# Patient Record
Sex: MALE | Race: WHITE | Employment: UNEMPLOYED | URBAN - METROPOLITAN AREA
[De-identification: names, ages, dates, MRNs, and addresses within clinical notes are randomized per-mention and may not be internally consistent; named-entity substitution may affect disease eponyms.]

---
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
## 2021-01-04 ENCOUNTER — OFFICE VISIT (OUTPATIENT)
Dept: FAMILY MEDICINE CLINIC | Age: 21
End: 2021-01-04
Payer: COMMERCIAL

## 2021-01-04 VITALS
BODY MASS INDEX: 22.22 KG/M2 | WEIGHT: 150 LBS | HEIGHT: 69 IN | SYSTOLIC BLOOD PRESSURE: 104 MMHG | DIASTOLIC BLOOD PRESSURE: 80 MMHG | HEART RATE: 86 BPM | OXYGEN SATURATION: 98 % | TEMPERATURE: 97.2 F

## 2021-01-04 DIAGNOSIS — F41.1 GAD (GENERALIZED ANXIETY DISORDER): ICD-10-CM

## 2021-01-04 DIAGNOSIS — Z23 NEED FOR PNEUMOCOCCAL VACCINATION: ICD-10-CM

## 2021-01-04 DIAGNOSIS — F33.3 SEVERE EPISODE OF RECURRENT MAJOR DEPRESSIVE DISORDER, WITH PSYCHOTIC FEATURES (HCC): ICD-10-CM

## 2021-01-04 DIAGNOSIS — Z23 NEED FOR INFLUENZA VACCINATION: ICD-10-CM

## 2021-01-04 DIAGNOSIS — Z72.0 TOBACCO USE: ICD-10-CM

## 2021-01-04 DIAGNOSIS — Z76.89 ENCOUNTER TO ESTABLISH CARE: Primary | ICD-10-CM

## 2021-01-04 PROCEDURE — G8482 FLU IMMUNIZE ORDER/ADMIN: HCPCS | Performed by: PHYSICIAN ASSISTANT

## 2021-01-04 PROCEDURE — 99203 OFFICE O/P NEW LOW 30 MIN: CPT | Performed by: PHYSICIAN ASSISTANT

## 2021-01-04 PROCEDURE — 90471 IMMUNIZATION ADMIN: CPT | Performed by: PHYSICIAN ASSISTANT

## 2021-01-04 PROCEDURE — 90472 IMMUNIZATION ADMIN EACH ADD: CPT | Performed by: PHYSICIAN ASSISTANT

## 2021-01-04 PROCEDURE — 90732 PPSV23 VACC 2 YRS+ SUBQ/IM: CPT | Performed by: PHYSICIAN ASSISTANT

## 2021-01-04 PROCEDURE — 90686 IIV4 VACC NO PRSV 0.5 ML IM: CPT | Performed by: PHYSICIAN ASSISTANT

## 2021-01-04 PROCEDURE — G8420 CALC BMI NORM PARAMETERS: HCPCS | Performed by: PHYSICIAN ASSISTANT

## 2021-01-04 PROCEDURE — 4004F PT TOBACCO SCREEN RCVD TLK: CPT | Performed by: PHYSICIAN ASSISTANT

## 2021-01-04 PROCEDURE — G8427 DOCREV CUR MEDS BY ELIG CLIN: HCPCS | Performed by: PHYSICIAN ASSISTANT

## 2021-01-04 RX ORDER — FLUOXETINE HYDROCHLORIDE 40 MG/1
40 CAPSULE ORAL DAILY
Qty: 30 CAPSULE | Refills: 5 | Status: SHIPPED | OUTPATIENT
Start: 2021-01-04 | End: 2021-07-22

## 2021-01-04 RX ORDER — BUPROPION HYDROCHLORIDE 150 MG/1
150 TABLET ORAL EVERY MORNING
Qty: 15 TABLET | Refills: 0 | Status: SHIPPED | OUTPATIENT
Start: 2021-01-04 | End: 2021-01-04

## 2021-01-04 RX ORDER — BUPROPION HYDROCHLORIDE 150 MG/1
150 TABLET ORAL EVERY MORNING
Qty: 30 TABLET | Refills: 3 | Status: SHIPPED | OUTPATIENT
Start: 2021-01-04 | End: 2021-05-24

## 2021-01-04 ASSESSMENT — ENCOUNTER SYMPTOMS
VOMITING: 0
ABDOMINAL PAIN: 0
CONSTIPATION: 0
SHORTNESS OF BREATH: 0
COUGH: 0
DIARRHEA: 0
NAUSEA: 0
CHEST TIGHTNESS: 0

## 2021-01-04 NOTE — PROGRESS NOTES
2021  Inder Cleveland (: 2000)  24 y.o.      HPI  The pt is here to establish care  [facility]. Taking linzess and prevacid. Mood:  Pt with hx of of ZAY and moderate depression most recently seen through [facility] at [facility]. Stressors: living situation is not ideal but pt denies feeling unsafe. Current symptoms include: decreased attention span, excessive worry, shaking, anxiety, anhedonia, decreased motivation. He denies having any SI/HI, impulsive behaviors. He does have a hx of bulimia which he states was a way to \"punihs\" himself. He is currently on Wellbutrin and prozac. He reports that the Wellbutrin is treatment for ADD. Per pt, previously on Adderall which caused decreased appetite and subsequent weight loss. Tobacco Use: Smokes three cigarettes per week. He reports cutting back significantly due to side effect of nausea. Per pt, hx of asthma as a child. Denies having any current shortness of breath, chest tightness, or wheezing    Review of Systems   Constitutional: Negative for activity change, appetite change and unexpected weight change. Respiratory: Negative for cough, chest tightness and shortness of breath. Cardiovascular: Negative for chest pain, palpitations and leg swelling. Gastrointestinal: Negative for abdominal pain, constipation, diarrhea, nausea and vomiting. Heart burn   Neurological: Negative for dizziness, light-headedness and headaches. Psychiatric/Behavioral: Positive for dysphoric mood and sleep disturbance. Negative for agitation, behavioral problems, confusion, decreased concentration, hallucinations, self-injury and suicidal ideas. The patient is nervous/anxious. The patient is not hyperactive. Allergies, past medical history, family history, and social history reviewed and unchanged from previous encounter.      Current Outpatient Medications   Medication Sig Dispense Refill    FLUoxetine (PROZAC) 40 MG capsule Take 1 capsule by mouth daily 30 capsule 5    buPROPion (WELLBUTRIN XL) 150 MG extended release tablet Take 1 tablet by mouth every morning for 15 days 15 tablet 0    cetirizine (ZYRTEC) 10 MG tablet Take 10 mg by mouth daily      lansoprazole (PREVACID) 30 MG delayed release capsule Take 30 mg by mouth daily      LINZESS 145 MCG capsule Take 30 mg by mouth daily       No current facility-administered medications for this visit. Vitals:    01/04/21 1046   BP: 104/80   Pulse: 86   Temp: 97.2 °F (36.2 °C)   TempSrc: Temporal   SpO2: 98%   Weight: 150 lb (68 kg)   Height: 5' 9\" (1.753 m)     Estimated body mass index is 22.15 kg/m² as calculated from the following:    Height as of this encounter: 5' 9\" (1.753 m). Weight as of this encounter: 150 lb (68 kg). Physical Exam  Vitals signs reviewed. Constitutional:       Appearance: Normal appearance. Cardiovascular:      Rate and Rhythm: Normal rate and regular rhythm. Heart sounds: Normal heart sounds. Pulmonary:      Effort: Pulmonary effort is normal.      Breath sounds: Normal breath sounds. Abdominal:      General: Bowel sounds are normal.      Palpations: Abdomen is soft. Neurological:      Mental Status: He is alert and oriented to person, place, and time. Cranial Nerves: No cranial nerve deficit. Psychiatric:         Attention and Perception: He is inattentive. Mood and Affect: Mood is anxious. Affect is blunt. Speech: Speech normal.         Behavior: Behavior normal. Behavior is cooperative. Thought Content: Thought content normal.         Cognition and Memory: Cognition and memory normal.         Judgment: Judgment normal.      Comments: Poor eye contact and poor hygiene         ASSESSMENT and PLAN:  Vinay Figueroa was seen today for new patient. Diagnoses and all orders for this visit:    Encounter to establish care    Tobacco use       -    Discussed importance of total cessation.  Pneumovax given today    Severe episode of recurrent major depressive disorder, with psychotic features ([facility] [address]  -     FLUoxetine (PROZAC) 40 MG capsule; Take 1 capsule by mouth daily  -     buPROPion (WELLBUTRIN XL) 150 MG extended release tablet; Take 1 tablet by mouth every morning for 15 days  -   Prozac increased to 40 mg daily. Pt to follow up with [facility] for more intensive therapy and medication management. If unable to get into [facility] please see me in three months. ZAY (generalized anxiety disorder)  -     FLUoxetine (PROZAC) 40 MG capsule; Take 1 capsule by mouth daily    Need for influenza vaccination  -     INFLUENZA, QUADV, 3 YRS AND OLDER, IM PF, PREFILL SYR OR SDV, 0.5ML (AFLURIA QUADV, PF)    Need for pneumococcal vaccination  -     PNEUMOVAX 23 subcutaneous/IM (Pneumococcal polysaccharide vaccine 23-valent >= 3yo)      Return in about 3 months (around 4/4/2021) for Mood.

## 2021-01-22 ENCOUNTER — CLINICAL DOCUMENTATION (OUTPATIENT)
Dept: SPIRITUAL SERVICES | Age: 21
End: 2021-01-22

## 2021-01-22 NOTE — FLOWSHEET NOTE
Final attempt to reach Pt for Outpatient Spiritual Care support. No answer on phone. Left voicemail and will mail letter to Pt, informing him of our availability for support.     [address] [facility]       01/22/21 8352   Encounter Summary   Services provided to: Patient not available   Referral/Consult From: [facility]   (1/22 attempted call OP, left VM, mailing letter) Your Covid-10 teste resulted not detected/negative. What happens if I have a negative test?    Remember to wash your hands often, avoid touching your face, stay 6 feet from people you do not live with, and wear a cloth facemask when you go out in public. A negative COVID-19 test at one point in time does not mean you will stay negative. You could become ill with COVID-19 and/or test positive at any time. If you are a close contact of a confirmed or suspected case, continue to stay home and away from others until 14 days after your last exposure. If you do not have symptoms, and were not in close contact with a confirmed or suspected case, you can stop isolating. If you currently have symptoms of COVID-19, and were not in close contact with a confirmed or suspected case, you should keep monitoring symptoms and talk to your doctor or other healthcare provider about staying home and if you need to get tested again. If you develop symptoms of COVID-19, stay at home and away from others and talk to your doctor or other healthcare provider about getting tested again. For additional information, visit coronavirus. [place].gov. For answers to your COVID-19 questions, call 4-864-6-ASK-[facility] ([phone]).

## 2021-05-24 RX ORDER — BUPROPION HYDROCHLORIDE 150 MG/1
TABLET ORAL
Qty: 90 TABLET | Refills: 1 | Status: SHIPPED | OUTPATIENT
Start: 2021-05-24 | End: 2021-12-01

## 2021-05-24 NOTE — TELEPHONE ENCOUNTER
.  Last office visit 1/4/2021     Last written 1-4-21 30 with 3      Next office visit scheduled Visit date not found    Requested Prescriptions     Pending Prescriptions Disp Refills    buPROPion (WELLBUTRIN XL) 150 MG extended release tablet [Pharmacy Med Name: BUPROPION HCL  MG TABLET] 90 tablet 1     Sig: TAKE 1 TABLET BY MOUTH EVERY DAY IN THE MORNING

## 2021-07-22 DIAGNOSIS — F33.3 SEVERE EPISODE OF RECURRENT MAJOR DEPRESSIVE DISORDER, WITH PSYCHOTIC FEATURES (HCC): ICD-10-CM

## 2021-07-22 DIAGNOSIS — F41.1 GAD (GENERALIZED ANXIETY DISORDER): ICD-10-CM

## 2021-07-22 RX ORDER — FLUOXETINE HYDROCHLORIDE 40 MG/1
CAPSULE ORAL
Qty: 30 CAPSULE | Refills: 5 | Status: SHIPPED | OUTPATIENT
Start: 2021-07-22 | End: 2021-12-01

## 2021-07-22 NOTE — TELEPHONE ENCOUNTER
Refill Request     Last Seen: Last Seen Department: 1/4/2021  Last Seen by PCP: 1/4/2021    Last Written: 1/4/21    Next Appointment:   No future appointments.         Requested Prescriptions     Pending Prescriptions Disp Refills    FLUoxetine (PROZAC) 40 MG capsule [Pharmacy Med Name: FLUOXETINE HCL 40 MG CAPSULE] 30 capsule 5     Sig: TAKE 1 CAPSULE BY MOUTH EVERY DAY

## 2021-12-01 ENCOUNTER — OFFICE VISIT (OUTPATIENT)
Dept: FAMILY MEDICINE CLINIC | Age: 21
End: 2021-12-01
Payer: COMMERCIAL

## 2021-12-01 VITALS
HEART RATE: 62 BPM | WEIGHT: 144 LBS | SYSTOLIC BLOOD PRESSURE: 110 MMHG | BODY MASS INDEX: 21.27 KG/M2 | OXYGEN SATURATION: 98 % | DIASTOLIC BLOOD PRESSURE: 68 MMHG

## 2021-12-01 DIAGNOSIS — Z23 NEED FOR INFLUENZA VACCINATION: ICD-10-CM

## 2021-12-01 DIAGNOSIS — F41.1 GAD (GENERALIZED ANXIETY DISORDER): ICD-10-CM

## 2021-12-01 DIAGNOSIS — F33.3 SEVERE EPISODE OF RECURRENT MAJOR DEPRESSIVE DISORDER, WITH PSYCHOTIC FEATURES (HCC): Primary | ICD-10-CM

## 2021-12-01 PROCEDURE — 90471 IMMUNIZATION ADMIN: CPT | Performed by: PHYSICIAN ASSISTANT

## 2021-12-01 PROCEDURE — G8427 DOCREV CUR MEDS BY ELIG CLIN: HCPCS | Performed by: PHYSICIAN ASSISTANT

## 2021-12-01 PROCEDURE — G8420 CALC BMI NORM PARAMETERS: HCPCS | Performed by: PHYSICIAN ASSISTANT

## 2021-12-01 PROCEDURE — 4004F PT TOBACCO SCREEN RCVD TLK: CPT | Performed by: PHYSICIAN ASSISTANT

## 2021-12-01 PROCEDURE — 99213 OFFICE O/P EST LOW 20 MIN: CPT | Performed by: PHYSICIAN ASSISTANT

## 2021-12-01 PROCEDURE — 90674 CCIIV4 VAC NO PRSV 0.5 ML IM: CPT | Performed by: PHYSICIAN ASSISTANT

## 2021-12-01 PROCEDURE — G8482 FLU IMMUNIZE ORDER/ADMIN: HCPCS | Performed by: PHYSICIAN ASSISTANT

## 2021-12-01 RX ORDER — VENLAFAXINE HYDROCHLORIDE 37.5 MG/1
CAPSULE, EXTENDED RELEASE ORAL
Qty: 56 CAPSULE | Refills: 0 | Status: SHIPPED | OUTPATIENT
Start: 2021-12-01 | End: 2022-01-24

## 2021-12-01 SDOH — ECONOMIC STABILITY: HOUSING INSECURITY: IN THE LAST 12 MONTHS, HOW MANY PLACES HAVE YOU LIVED?: 1

## 2021-12-01 SDOH — ECONOMIC STABILITY: TRANSPORTATION INSECURITY
IN THE PAST 12 MONTHS, HAS LACK OF TRANSPORTATION KEPT YOU FROM MEETINGS, WORK, OR FROM GETTING THINGS NEEDED FOR DAILY LIVING?: NO

## 2021-12-01 SDOH — ECONOMIC STABILITY: FOOD INSECURITY: WITHIN THE PAST 12 MONTHS, YOU WORRIED THAT YOUR FOOD WOULD RUN OUT BEFORE YOU GOT MONEY TO BUY MORE.: NEVER TRUE

## 2021-12-01 SDOH — ECONOMIC STABILITY: FOOD INSECURITY: WITHIN THE PAST 12 MONTHS, THE FOOD YOU BOUGHT JUST DIDN'T LAST AND YOU DIDN'T HAVE MONEY TO GET MORE.: NEVER TRUE

## 2021-12-01 SDOH — ECONOMIC STABILITY: TRANSPORTATION INSECURITY
IN THE PAST 12 MONTHS, HAS THE LACK OF TRANSPORTATION KEPT YOU FROM MEDICAL APPOINTMENTS OR FROM GETTING MEDICATIONS?: NO

## 2021-12-01 SDOH — ECONOMIC STABILITY: INCOME INSECURITY: IN THE LAST 12 MONTHS, WAS THERE A TIME WHEN YOU WERE NOT ABLE TO PAY THE MORTGAGE OR RENT ON TIME?: NO

## 2021-12-01 SDOH — ECONOMIC STABILITY: HOUSING INSECURITY
IN THE LAST 12 MONTHS, WAS THERE A TIME WHEN YOU DID NOT HAVE A STEADY PLACE TO SLEEP OR SLEPT IN A SHELTER (INCLUDING NOW)?: NO

## 2021-12-01 ASSESSMENT — SOCIAL DETERMINANTS OF HEALTH (SDOH): HOW HARD IS IT FOR YOU TO PAY FOR THE VERY BASICS LIKE FOOD, HOUSING, MEDICAL CARE, AND HEATING?: NOT HARD AT ALL

## 2021-12-01 NOTE — PROGRESS NOTES
Med Scott (:  2000) is a 22 y.o. male,Established patient, here for evaluation of the following chief complaint(s):  Depression (follow up, discuss meds )         ASSESSMENT/PLAN:  1. Severe episode of recurrent major depressive disorder, with psychotic features (HCC)  -     venlafaxine (EFFEXOR XR) 37.5 MG extended release capsule; Take 1 capsule by mouth daily for 14 days, THEN 2 capsules daily for 21 days. , Disp-56 capsule, R-0Normal  -     Medication risks, benefits and side effects discussed. When anxiety and depression are under better control we could consider trying a different stimulant medication than Adderall. Mom agrees with this plan  2. ZAY (generalized anxiety disorder)  -     venlafaxine (EFFEXOR XR) 37.5 MG extended release capsule; Take 1 capsule by mouth daily for 14 days, THEN 2 capsules daily for 21 days. , Disp-56 capsule, R-0Normal  3. Need for influenza vaccination  -     INFLUENZA, MDCK QUADV, 2 YRS AND OLDER, IM, PF, PREFILL SYR OR SDV, 0.5ML (FLUCELVAX QUADV, PF)      Return in about 5 weeks (around 2022) for Mood. Subjective   SUBJECTIVE/OBJECTIVE:  HPI  Depression:  Current medication: wellbutrin and prozac, pt stopped this medication after our last visit due to side effects  Side effects: none  Residual symptoms: anxiety, depression, increased stress  Denies: suicidal ideation, difficulty sleeping, paranoia or manic type symptoms  Past medications:Lexapro, wellbutrin, prozac    There is a hx of ADD that they would like to have addressed however the pt is reluctant to start a stimulant due to side effects from Adderall that he was on as a child  Mood is currently uncontrolled      Review of Systems   Constitutional: Negative for appetite change and unexpected weight change. Neurological: Negative for dizziness, light-headedness and headaches. Psychiatric/Behavioral: Positive for decreased concentration and dysphoric mood.  Negative for agitation, behavioral problems, confusion, hallucinations, self-injury, sleep disturbance and suicidal ideas. The patient is nervous/anxious. The patient is not hyperactive. Objective   Physical Exam  Vitals reviewed. Constitutional:       Appearance: Normal appearance. Comments: Poor eye contact   Neurological:      Mental Status: He is alert. Psychiatric:         Attention and Perception: Perception normal. He is inattentive. Mood and Affect: Affect normal. Mood is anxious. Speech: Speech is delayed. Behavior: Behavior normal. Behavior is cooperative. Thought Content: Thought content normal.         Cognition and Memory: Cognition and memory normal.         Judgment: Judgment normal.                  An electronic signature was used to authenticate this note.     --SIMRAN Chaves

## 2021-12-06 ENCOUNTER — TELEPHONE (OUTPATIENT)
Dept: FAMILY MEDICINE CLINIC | Age: 21
End: 2021-12-06

## 2021-12-06 NOTE — TELEPHONE ENCOUNTER
Received [facility] results back. Effexor is on the approved list of medications, please see if they would like a copy of his results.  Thank you

## 2022-01-10 ENCOUNTER — TELEPHONE (OUTPATIENT)
Dept: FAMILY MEDICINE CLINIC | Age: 22
End: 2022-01-10

## 2022-01-24 DIAGNOSIS — F33.3 SEVERE EPISODE OF RECURRENT MAJOR DEPRESSIVE DISORDER, WITH PSYCHOTIC FEATURES (HCC): ICD-10-CM

## 2022-01-24 DIAGNOSIS — F41.1 GAD (GENERALIZED ANXIETY DISORDER): ICD-10-CM

## 2022-01-24 RX ORDER — VENLAFAXINE HYDROCHLORIDE 75 MG/1
75 CAPSULE, EXTENDED RELEASE ORAL DAILY
Qty: 90 CAPSULE | Refills: 1 | Status: SHIPPED | OUTPATIENT
Start: 2022-01-24 | End: 2022-07-08

## 2022-01-24 NOTE — TELEPHONE ENCOUNTER
Refill Request     Last Seen: Last Seen Department: 12/1/2021  Last Seen by PCP: 12/1/2021    Last Written: 12/1/2021    Next Appointment:   Future Appointments   Date Time Provider Peter Pyle   2/22/2022  9:00 AM SIMRAN Chang             Requested Prescriptions     Pending Prescriptions Disp Refills    venlafaxine (EFFEXOR XR) 37.5 MG extended release capsule [Pharmacy Med Name: VENLAFAXINE HCL ER 37.5 MG CAP] 56 capsule 0     Sig: TAKE 1 CAPSULE BY MOUTH DAILY FOR 14 DAYS, THEN 2 CAPSULES DAILY FOR 21 DAYS.

## 2022-02-22 ENCOUNTER — OFFICE VISIT (OUTPATIENT)
Dept: FAMILY MEDICINE CLINIC | Age: 22
End: 2022-02-22
Payer: COMMERCIAL

## 2022-02-22 VITALS
SYSTOLIC BLOOD PRESSURE: 116 MMHG | BODY MASS INDEX: 20 KG/M2 | TEMPERATURE: 98.1 F | HEIGHT: 68 IN | OXYGEN SATURATION: 98 % | HEART RATE: 65 BPM | DIASTOLIC BLOOD PRESSURE: 72 MMHG | WEIGHT: 132 LBS

## 2022-02-22 DIAGNOSIS — K21.9 GASTROESOPHAGEAL REFLUX DISEASE, UNSPECIFIED WHETHER ESOPHAGITIS PRESENT: ICD-10-CM

## 2022-02-22 DIAGNOSIS — F33.3 SEVERE EPISODE OF RECURRENT MAJOR DEPRESSIVE DISORDER, WITH PSYCHOTIC FEATURES (HCC): Primary | ICD-10-CM

## 2022-02-22 DIAGNOSIS — F50.2 BULIMIA NERVOSA: ICD-10-CM

## 2022-02-22 DIAGNOSIS — F41.1 GAD (GENERALIZED ANXIETY DISORDER): ICD-10-CM

## 2022-02-22 PROBLEM — F50.20 BULIMIA NERVOSA: Status: ACTIVE | Noted: 2022-02-22

## 2022-02-22 PROCEDURE — G8427 DOCREV CUR MEDS BY ELIG CLIN: HCPCS | Performed by: PHYSICIAN ASSISTANT

## 2022-02-22 PROCEDURE — 99214 OFFICE O/P EST MOD 30 MIN: CPT | Performed by: PHYSICIAN ASSISTANT

## 2022-02-22 PROCEDURE — 4004F PT TOBACCO SCREEN RCVD TLK: CPT | Performed by: PHYSICIAN ASSISTANT

## 2022-02-22 PROCEDURE — G8420 CALC BMI NORM PARAMETERS: HCPCS | Performed by: PHYSICIAN ASSISTANT

## 2022-02-22 PROCEDURE — G8482 FLU IMMUNIZE ORDER/ADMIN: HCPCS | Performed by: PHYSICIAN ASSISTANT

## 2022-02-22 RX ORDER — LANSOPRAZOLE 30 MG/1
30 CAPSULE, DELAYED RELEASE ORAL DAILY
Qty: 90 CAPSULE | Refills: 1 | Status: SHIPPED | OUTPATIENT
Start: 2022-02-22 | End: 2022-09-19

## 2022-02-22 RX ORDER — DESVENLAFAXINE 50 MG/1
50 TABLET, EXTENDED RELEASE ORAL DAILY
Qty: 30 TABLET | Refills: 3 | Status: SHIPPED | OUTPATIENT
Start: 2022-02-22 | End: 2022-06-20

## 2022-02-22 ASSESSMENT — ENCOUNTER SYMPTOMS
BLOOD IN STOOL: 0
ABDOMINAL DISTENTION: 0
VOMITING: 1
ABDOMINAL PAIN: 0

## 2022-02-22 ASSESSMENT — PATIENT HEALTH QUESTIONNAIRE - PHQ9
9. THOUGHTS THAT YOU WOULD BE BETTER OFF DEAD, OR OF HURTING YOURSELF: 0
3. TROUBLE FALLING OR STAYING ASLEEP: 0
5. POOR APPETITE OR OVEREATING: 3
4. FEELING TIRED OR HAVING LITTLE ENERGY: 0
SUM OF ALL RESPONSES TO PHQ QUESTIONS 1-9: 15
10. IF YOU CHECKED OFF ANY PROBLEMS, HOW DIFFICULT HAVE THESE PROBLEMS MADE IT FOR YOU TO DO YOUR WORK, TAKE CARE OF THINGS AT HOME, OR GET ALONG WITH OTHER PEOPLE: 1
SUM OF ALL RESPONSES TO PHQ9 QUESTIONS 1 & 2: 4
SUM OF ALL RESPONSES TO PHQ QUESTIONS 1-9: 15
7. TROUBLE CONCENTRATING ON THINGS, SUCH AS READING THE NEWSPAPER OR WATCHING TELEVISION: 3
6. FEELING BAD ABOUT YOURSELF - OR THAT YOU ARE A FAILURE OR HAVE LET YOURSELF OR YOUR FAMILY DOWN: 3
2. FEELING DOWN, DEPRESSED OR HOPELESS: 2
8. MOVING OR SPEAKING SO SLOWLY THAT OTHER PEOPLE COULD HAVE NOTICED. OR THE OPPOSITE, BEING SO FIGETY OR RESTLESS THAT YOU HAVE BEEN MOVING AROUND A LOT MORE THAN USUAL: 2
SUM OF ALL RESPONSES TO PHQ QUESTIONS 1-9: 15
1. LITTLE INTEREST OR PLEASURE IN DOING THINGS: 2
SUM OF ALL RESPONSES TO PHQ QUESTIONS 1-9: 15

## 2022-02-22 NOTE — PROGRESS NOTES
Med Scott (:  2000) is a 22 y.o. male,Established patient, here for evaluation of the following chief complaint(s): Anxiety and Depression         ASSESSMENT/PLAN:  1. Severe episode of recurrent major depressive disorder, with psychotic features ([address]  -     desvenlafaxine succinate (PRISTIQ) 50 MG TB24 extended release tablet; Take 1 tablet by mouth daily, Disp-30 tablet, R-3Normal  -     Uncontrolled. Medication risks, benefits and side effects discussed. Pt has stopped effexor so no need to wean off of medication  2. ZAY (generalized anxiety disorder)  -     desvenlafaxine succinate (PRISTIQ) 50 MG TB24 extended release tablet; Take 1 tablet by mouth daily, Disp-30 tablet, R-3Normal  3. Bulimia nervosa       -   Follow up with [facility]. Gene sight results reviewed with the patient and given to the patient today to take to [facility]. Encouraged them to follow up with psychiatry there for eating disorder and mood  4. Gastroesophageal reflux disease, unspecified whether esophagitis present  -     lansoprazole (PREVACID) 30 MG delayed release capsule; Take 1 capsule by mouth daily, Disp-90 capsule, R-1Normal        -     Uncontrolled, restart medication, follow up with GI in April    No follow-ups on file. Subjective   SUBJECTIVE/OBJECTIVE:  HPI  Depression:  Current medication: effexor 75 mg, stopped taking this medication one week ago due to irritability, has noticed an improvement without this medication  Side effects: irritability  Residual symptoms: depression, anxiety, intentional vomiting and weight loss  Denies: SI/HI, impulsive behaviors  Past medications: Lexapro, wellubtrin, prozac and effexor  Pt has been referred to the [facility] for help with eating disorders    Will be seeing GI in April for vomiting as there is concern that vomiting may also be unintentional. Mom has a personal history of gastroparesis.  [facility]  Needs a refill on his prevacid    Review of Systems   Constitutional: Positive for unexpected weight change. Negative for activity change and appetite change. Eyes: Negative for visual disturbance. Gastrointestinal: Positive for vomiting. Negative for abdominal distention, abdominal pain and blood in stool. Neurological: Negative for light-headedness. Psychiatric/Behavioral: Positive for agitation, dysphoric mood and sleep disturbance. Negative for behavioral problems, confusion, decreased concentration, hallucinations, self-injury and suicidal ideas. The patient is nervous/anxious. The patient is not hyperactive. Objective   Physical Exam  Vitals reviewed. Neurological:      Mental Status: He is alert and oriented to person, place, and time. Cranial Nerves: No cranial nerve deficit. Psychiatric:         Attention and Perception: Perception normal. He is inattentive. Mood and Affect: Mood is depressed. Affect is flat. Speech: Speech normal.         Behavior: Behavior is withdrawn. Behavior is cooperative. Thought Content: Thought content normal.         Cognition and Memory: Cognition and memory normal.         Judgment: Judgment normal.      Comments: Poor eye contact                  An electronic signature was used to authenticate this note.     --SIMRAN Granados

## 2022-02-23 ENCOUNTER — TELEPHONE (OUTPATIENT)
Dept: FAMILY MEDICINE CLINIC | Age: 22
End: 2022-02-23

## 2022-02-24 NOTE — TELEPHONE ENCOUNTER
Submitted PA for Lansoprazole 30MG dr capsules, Key: [id number]. Medication has been APPROVED through 02/24/2023. Please notify patient. Thank you.

## 2022-06-02 ENCOUNTER — TELEPHONE (OUTPATIENT)
Dept: FAMILY MEDICINE CLINIC | Age: 22
End: 2022-06-02

## 2022-06-02 NOTE — TELEPHONE ENCOUNTER
----- Message from Maraline Coleman sent at 6/2/2022  2:09 PM EDT -----  Subject: Message to Provider    QUESTIONS  Information for Provider? Mother Del Gilbert called in and requested to get   basic overall labs done for patient. Please call and advise on scheduling.     ---------------------------------------------------------------------------  --------------  CALL BACK INFO  What is the best way for the office to contact you? OK to leave message on   voicemail  Preferred Call Back Phone Number? [phone]  ---------------------------------------------------------------------------  --------------  SCRIPT ANSWERS  Relationship to Patient? Parent  Representative Name? Geena  Patient is under 25 and the Parent has custody? No  Is the Representative on the appropriate HIPAA document in Epic?  Yes

## 2022-06-20 DIAGNOSIS — F41.1 GAD (GENERALIZED ANXIETY DISORDER): ICD-10-CM

## 2022-06-20 DIAGNOSIS — F33.3 SEVERE EPISODE OF RECURRENT MAJOR DEPRESSIVE DISORDER, WITH PSYCHOTIC FEATURES (HCC): ICD-10-CM

## 2022-06-20 RX ORDER — DESVENLAFAXINE 50 MG/1
TABLET, EXTENDED RELEASE ORAL
Qty: 90 TABLET | Refills: 1 | Status: SHIPPED | OUTPATIENT
Start: 2022-06-20

## 2022-06-20 NOTE — TELEPHONE ENCOUNTER
.  Refill Request     CONFIRM preferrred pharmacy with the patient. If Mail Order Rx - Pend for 90 day refill.       Last Seen: Last Seen Department: 2/22/2022  Last Seen by PCP: 2/22/2022    Last Written: 2-22-22 30 with 3     Next Appointment:   Future Appointments   Date Time Provider Peter Pyle   7/8/2022  2:30 PM SIMRAN Marroquin  [place] [facility]       Future appointment scheduled      Requested Prescriptions     Pending Prescriptions Disp Refills    desvenlafaxine succinate (PRISTIQ) 50 MG TB24 extended release tablet [Pharmacy Med Name: DESVENLAFAXINE SUCCNT ER 50 MG] 30 tablet 3     Sig: TAKE 1 TABLET BY MOUTH EVERY DAY

## 2022-06-28 PROBLEM — F50.82 AVOIDANT/RESTRICTIVE FOOD INTAKE DISORDER: Status: ACTIVE | Noted: 2022-06-28

## 2022-07-08 ENCOUNTER — OFFICE VISIT (OUTPATIENT)
Dept: FAMILY MEDICINE CLINIC | Age: 22
End: 2022-07-08
Payer: COMMERCIAL

## 2022-07-08 VITALS
HEIGHT: 69 IN | WEIGHT: 145.6 LBS | HEART RATE: 87 BPM | SYSTOLIC BLOOD PRESSURE: 132 MMHG | OXYGEN SATURATION: 99 % | BODY MASS INDEX: 21.56 KG/M2 | DIASTOLIC BLOOD PRESSURE: 70 MMHG

## 2022-07-08 DIAGNOSIS — F50.82 AVOIDANT/RESTRICTIVE FOOD INTAKE DISORDER: ICD-10-CM

## 2022-07-08 DIAGNOSIS — F33.3 SEVERE EPISODE OF RECURRENT MAJOR DEPRESSIVE DISORDER, WITH PSYCHOTIC FEATURES (HCC): Primary | ICD-10-CM

## 2022-07-08 DIAGNOSIS — K58.1 IRRITABLE BOWEL SYNDROME WITH CONSTIPATION: ICD-10-CM

## 2022-07-08 DIAGNOSIS — F41.1 GAD (GENERALIZED ANXIETY DISORDER): ICD-10-CM

## 2022-07-08 DIAGNOSIS — F50.2 BULIMIA NERVOSA: ICD-10-CM

## 2022-07-08 LAB
A/G RATIO: 2.8 (ref 1.1–2.2)
ALBUMIN SERPL-MCNC: 4.7 G/DL (ref 3.4–5)
ALP BLD-CCNC: 95 U/L (ref 40–129)
ALT SERPL-CCNC: 26 U/L (ref 10–40)
ANION GAP SERPL CALCULATED.3IONS-SCNC: 12 MMOL/L (ref 3–16)
AST SERPL-CCNC: 29 U/L (ref 15–37)
BASOPHILS ABSOLUTE: 0 K/UL (ref 0–0.2)
BASOPHILS RELATIVE PERCENT: 0.6 %
BILIRUB SERPL-MCNC: <0.2 MG/DL (ref 0–1)
BUN BLDV-MCNC: 13 MG/DL (ref 7–20)
CALCIUM SERPL-MCNC: 9.1 MG/DL (ref 8.3–10.6)
CHLORIDE BLD-SCNC: 105 MMOL/L (ref 99–110)
CO2: 27 MMOL/L (ref 21–32)
CREAT SERPL-MCNC: 0.7 MG/DL (ref 0.9–1.3)
EOSINOPHILS ABSOLUTE: 0.2 K/UL (ref 0–0.6)
EOSINOPHILS RELATIVE PERCENT: 2.8 %
GFR AFRICAN AMERICAN: >60
GFR NON-AFRICAN AMERICAN: >60
GLUCOSE BLD-MCNC: 84 MG/DL (ref 70–99)
HCT VFR BLD CALC: 37.9 % (ref 40.5–52.5)
HEMOGLOBIN: 13.1 G/DL (ref 13.5–17.5)
LYMPHOCYTES ABSOLUTE: 2 K/UL (ref 1–5.1)
LYMPHOCYTES RELATIVE PERCENT: 29.7 %
MCH RBC QN AUTO: 35 PG (ref 26–34)
MCHC RBC AUTO-ENTMCNC: 34.4 G/DL (ref 31–36)
MCV RBC AUTO: 101.7 FL (ref 80–100)
MONOCYTES ABSOLUTE: 0.4 K/UL (ref 0–1.3)
MONOCYTES RELATIVE PERCENT: 6.4 %
NEUTROPHILS ABSOLUTE: 4.1 K/UL (ref 1.7–7.7)
NEUTROPHILS RELATIVE PERCENT: 60.5 %
PDW BLD-RTO: 13.7 % (ref 12.4–15.4)
PLATELET # BLD: 304 K/UL (ref 135–450)
PMV BLD AUTO: 7.2 FL (ref 5–10.5)
POTASSIUM SERPL-SCNC: 4.2 MMOL/L (ref 3.5–5.1)
RBC # BLD: 3.73 M/UL (ref 4.2–5.9)
SODIUM BLD-SCNC: 144 MMOL/L (ref 136–145)
TOTAL PROTEIN: 6.4 G/DL (ref 6.4–8.2)
WBC # BLD: 6.7 K/UL (ref 4–11)

## 2022-07-08 PROCEDURE — G8427 DOCREV CUR MEDS BY ELIG CLIN: HCPCS | Performed by: PHYSICIAN ASSISTANT

## 2022-07-08 PROCEDURE — 99214 OFFICE O/P EST MOD 30 MIN: CPT | Performed by: PHYSICIAN ASSISTANT

## 2022-07-08 PROCEDURE — G8420 CALC BMI NORM PARAMETERS: HCPCS | Performed by: PHYSICIAN ASSISTANT

## 2022-07-08 PROCEDURE — 1036F TOBACCO NON-USER: CPT | Performed by: PHYSICIAN ASSISTANT

## 2022-07-08 RX ORDER — BUSPIRONE HYDROCHLORIDE 10 MG/1
10 TABLET ORAL 2 TIMES DAILY
Qty: 60 TABLET | Refills: 0 | Status: SHIPPED | OUTPATIENT
Start: 2022-07-08 | End: 2022-08-04

## 2022-07-08 ASSESSMENT — ENCOUNTER SYMPTOMS
CONSTIPATION: 0
BLOOD IN STOOL: 0
ABDOMINAL PAIN: 0

## 2022-07-08 ASSESSMENT — ANXIETY QUESTIONNAIRES
5. BEING SO RESTLESS THAT IT IS HARD TO SIT STILL: 2
4. TROUBLE RELAXING: 3
2. NOT BEING ABLE TO STOP OR CONTROL WORRYING: 3
3. WORRYING TOO MUCH ABOUT DIFFERENT THINGS: 2
GAD7 TOTAL SCORE: 13
1. FEELING NERVOUS, ANXIOUS, OR ON EDGE: 2
6. BECOMING EASILY ANNOYED OR IRRITABLE: 1
7. FEELING AFRAID AS IF SOMETHING AWFUL MIGHT HAPPEN: 0
IF YOU CHECKED OFF ANY PROBLEMS ON THIS QUESTIONNAIRE, HOW DIFFICULT HAVE THESE PROBLEMS MADE IT FOR YOU TO DO YOUR WORK, TAKE CARE OF THINGS AT HOME, OR GET ALONG WITH OTHER PEOPLE: SOMEWHAT DIFFICULT

## 2022-07-08 ASSESSMENT — PATIENT HEALTH QUESTIONNAIRE - PHQ9
6. FEELING BAD ABOUT YOURSELF - OR THAT YOU ARE A FAILURE OR HAVE LET YOURSELF OR YOUR FAMILY DOWN: 2
10. IF YOU CHECKED OFF ANY PROBLEMS, HOW DIFFICULT HAVE THESE PROBLEMS MADE IT FOR YOU TO DO YOUR WORK, TAKE CARE OF THINGS AT HOME, OR GET ALONG WITH OTHER PEOPLE: 1
SUM OF ALL RESPONSES TO PHQ QUESTIONS 1-9: 15
9. THOUGHTS THAT YOU WOULD BE BETTER OFF DEAD, OR OF HURTING YOURSELF: 0
3. TROUBLE FALLING OR STAYING ASLEEP: 3
7. TROUBLE CONCENTRATING ON THINGS, SUCH AS READING THE NEWSPAPER OR WATCHING TELEVISION: 1
8. MOVING OR SPEAKING SO SLOWLY THAT OTHER PEOPLE COULD HAVE NOTICED. OR THE OPPOSITE, BEING SO FIGETY OR RESTLESS THAT YOU HAVE BEEN MOVING AROUND A LOT MORE THAN USUAL: 2
2. FEELING DOWN, DEPRESSED OR HOPELESS: 2
1. LITTLE INTEREST OR PLEASURE IN DOING THINGS: 2
5. POOR APPETITE OR OVEREATING: 1
4. FEELING TIRED OR HAVING LITTLE ENERGY: 2
SUM OF ALL RESPONSES TO PHQ9 QUESTIONS 1 & 2: 4

## 2022-07-08 NOTE — PROGRESS NOTES
Med Scott (:  2000) is a 25 y.o. male,Established patient, here for evaluation of the following chief complaint(s):  Blood Work         ASSESSMENT/PLAN:  1. Severe episode of recurrent major depressive disorder, with psychotic features ([address]       -    Improved, not at goal. Continue with pristiq. Resources provided for therapy. 2. ZAY (generalized anxiety disorder)  -     busPIRone (BUSPAR) 10 MG tablet; Take 1 tablet by mouth 2 times daily, Disp-60 tablet, R-0Normal  -    Uncontrolled, majority of symptoms. Start buspar. Medication risks, benefits and side effects discussed with the patient. Follow up in 3 months  3. Avoidant/restrictive food intake disorder  -     CBC with Auto Differential; Future  -     Comprehensive Metabolic Panel; Future  -     Vitamin D 25 Hydroxy; Future  -     Vitamin B12; Future  -     Folate; Future  -     Prealbumin; Future  -     Has been increasing protein in diet and per pt, had a total 20 lb weight gain  4. Bulimia nervosa  -     CBC with Auto Differential; Future  -     Comprehensive Metabolic Panel; Future  -     Vitamin D 25 Hydroxy; Future  -     Vitamin B12; Future  -     Folate; Future  -     Prealbumin; Future  -     Stable, has been 2 months since purging      Return in about 3 months (around 10/8/2022) for ZAY and MDD. Subjective   SUBJECTIVE/OBJECTIVE:  HPI  MDD:  Improving per pt  Current medication: pristiq  Current symptoms: excessive worry, anxiety, sadness, waking at night with panic  Denies: bulimia, dysphoric mood, paranoia, hallucinations, food restriction, SI/HI  Has gained 20 lbs by adding in meals and protein  Has appointment in one month to see [facility] and potentially get connected with a therapist that specializes in eating disorders  Just started a new job  Stopped smoking tobacco but still using marijuana    Review of Systems   Constitutional: Positive for appetite change. Negative for activity change and unexpected weight change. Gastrointestinal: Negative for abdominal pain, blood in stool and constipation. Neurological: Negative for dizziness and headaches. Psychiatric/Behavioral: Positive for behavioral problems, dysphoric mood and sleep disturbance. Negative for agitation, confusion, decreased concentration, hallucinations, self-injury and suicidal ideas. The patient is nervous/anxious. The patient is not hyperactive. Objective   Physical Exam  Vitals reviewed. Constitutional:       Appearance: Normal appearance. Neck:      Thyroid: No thyromegaly. Cardiovascular:      Rate and Rhythm: Normal rate and regular rhythm. Heart sounds: Normal heart sounds. Pulmonary:      Effort: Pulmonary effort is normal.      Breath sounds: Normal breath sounds. Abdominal:      General: Bowel sounds are normal.      Palpations: Abdomen is soft. Neurological:      Mental Status: He is alert and oriented to person, place, and time. Cranial Nerves: No cranial nerve deficit. Psychiatric:         Attention and Perception: Attention and perception normal.         Mood and Affect: Mood is anxious and depressed. Speech: Speech normal.         Behavior: Behavior normal. Behavior is cooperative. Thought Content: Thought content normal.         Cognition and Memory: Cognition and memory normal.         Judgment: Judgment normal.                  An electronic signature was used to authenticate this note.     --SIMRAN Yepez

## 2022-07-09 DIAGNOSIS — E53.8 VITAMIN B 12 DEFICIENCY: ICD-10-CM

## 2022-07-09 DIAGNOSIS — E55.9 VITAMIN D INSUFFICIENCY: Primary | ICD-10-CM

## 2022-07-09 LAB
FOLATE: <2 NG/ML (ref 4.78–24.2)
PREALBUMIN: 36.3 MG/DL (ref 20–40)
VITAMIN B-12: 174 PG/ML (ref 211–911)
VITAMIN D 25-HYDROXY: 23.7 NG/ML

## 2022-07-09 RX ORDER — FOLIC ACID 1 MG/1
1 TABLET ORAL DAILY
Qty: 90 TABLET | Refills: 3 | Status: SHIPPED | OUTPATIENT
Start: 2022-07-09

## 2022-07-09 RX ORDER — LANOLIN ALCOHOL/MO/W.PET/CERES
1000 CREAM (GRAM) TOPICAL DAILY
Qty: 90 TABLET | Refills: 3 | Status: SHIPPED | OUTPATIENT
Start: 2022-07-09

## 2022-07-09 RX ORDER — MELATONIN
1000 DAILY
Qty: 90 TABLET | Refills: 1 | Status: SHIPPED | OUTPATIENT
Start: 2022-07-09

## 2022-07-20 DIAGNOSIS — F33.3 SEVERE EPISODE OF RECURRENT MAJOR DEPRESSIVE DISORDER, WITH PSYCHOTIC FEATURES (HCC): ICD-10-CM

## 2022-07-20 DIAGNOSIS — F41.1 GAD (GENERALIZED ANXIETY DISORDER): ICD-10-CM

## 2022-07-20 RX ORDER — VENLAFAXINE HYDROCHLORIDE 75 MG/1
CAPSULE, EXTENDED RELEASE ORAL
Qty: 30 CAPSULE | Refills: 5 | OUTPATIENT
Start: 2022-07-20

## 2022-07-20 NOTE — TELEPHONE ENCOUNTER
Refill Request     CONFIRM preferrred pharmacy with the patient. If Mail Order Rx - Pend for 90 day refill.       Last Seen: Last Seen Department: 7/8/2022  Last Seen by PCP: 7/8/2022    Last Written: 01/24/2022 90 Capsule 1 Refill    Next Appointment:   Future Appointments   Date Time Provider Peter Pyle   10/7/2022  3:15 PM SIMRAN Lincoln  [place] [facility]       Future appointment scheduled      Requested Prescriptions     Pending Prescriptions Disp Refills    venlafaxine (EFFEXOR XR) 75 MG extended release capsule [Pharmacy Med Name: VENLAFAXINE HCL ER 75 MG CAP] 30 capsule 5     Sig: TAKE 1 CAPSULE BY MOUTH EVERY DAY

## 2022-08-04 DIAGNOSIS — F41.1 GAD (GENERALIZED ANXIETY DISORDER): ICD-10-CM

## 2022-08-04 RX ORDER — BUSPIRONE HYDROCHLORIDE 10 MG/1
TABLET ORAL
Qty: 60 TABLET | Refills: 0 | Status: SHIPPED | OUTPATIENT
Start: 2022-08-04 | End: 2022-09-09 | Stop reason: SDUPTHER

## 2022-08-04 NOTE — TELEPHONE ENCOUNTER
Refill Request     CONFIRM preferrred pharmacy with the patient. If Mail Order Rx - Pend for 90 day refill.       Last Seen: Last Seen Department: 7/8/2022  Last Seen by PCP: 7/8/2022    Last Written: 07/08/2022 60 tablet 0 refills    Next Appointment:   Future Appointments   Date Time Provider Peter Pyle   10/7/2022  3:15 PM SIMRAN Dow  [place] [facility]       Future appointment scheduled      Requested Prescriptions     Pending Prescriptions Disp Refills    busPIRone (BUSPAR) 10 MG tablet [Pharmacy Med Name: BUSPIRONE HCL 10 MG TABLET] 60 tablet 0     Sig: TAKE 1 TABLET BY MOUTH TWICE A DAY

## 2022-09-09 DIAGNOSIS — F41.1 GAD (GENERALIZED ANXIETY DISORDER): ICD-10-CM

## 2022-09-09 RX ORDER — BUSPIRONE HYDROCHLORIDE 10 MG/1
TABLET ORAL
Qty: 60 TABLET | Refills: 0 | Status: SHIPPED | OUTPATIENT
Start: 2022-09-09 | End: 2022-10-10

## 2022-09-09 NOTE — TELEPHONE ENCOUNTER
Refill Request     CONFIRM preferrred pharmacy with the patient. If Mail Order Rx - Pend for 90 day refill.       Last Seen: Last Seen Department: 7/8/2022  Last Seen by PCP: 7/8/2022    Last Written: 60 with 0 8/4/2022     Next Appointment:   Future Appointments   Date Time Provider Peter Pyle   10/7/2022  3:15 PM SIMRAN Lan  [place] [facility]       Future appointment scheduled      Requested Prescriptions     Pending Prescriptions Disp Refills    busPIRone (BUSPAR) 10 MG tablet 60 tablet 0     Sig: TAKE 1 TABLET BY MOUTH TWICE A DAY

## 2022-09-19 DIAGNOSIS — K21.9 GASTROESOPHAGEAL REFLUX DISEASE, UNSPECIFIED WHETHER ESOPHAGITIS PRESENT: ICD-10-CM

## 2022-09-19 RX ORDER — LANSOPRAZOLE 30 MG/1
CAPSULE, DELAYED RELEASE ORAL
Qty: 90 CAPSULE | Refills: 1 | Status: SHIPPED | OUTPATIENT
Start: 2022-09-19

## 2022-09-19 NOTE — TELEPHONE ENCOUNTER
.Refill Request     CONFIRM preferrred pharmacy with the patient. If Mail Order Rx - Pend for 90 day refill. Last Seen: Last Seen Department: 7/8/2022  Last Seen by PCP: 7/8/2022    Last Written: 2-22-22 90 with 1     If no future appointment scheduled, route STAFF MESSAGE with patient name to the [facility] for scheduling. Next Appointment:   Future Appointments   Date Time Provider Peter Pyle   10/7/2022  3:15 PM SIMRAN Lobo - [facility]       Message sent to [address] [place] [address] to schedule appt with patient?   NO      Requested Prescriptions     Pending Prescriptions Disp Refills    lansoprazole (PREVACID) 30 MG delayed release capsule [Pharmacy Med Name: LANSOPRAZOLE DR 30 MG CAPSULE] 90 capsule 1     Sig: TAKE 1 CAPSULE BY MOUTH EVERY DAY

## 2022-10-09 DIAGNOSIS — F41.1 GAD (GENERALIZED ANXIETY DISORDER): ICD-10-CM

## 2022-10-10 RX ORDER — BUSPIRONE HYDROCHLORIDE 10 MG/1
TABLET ORAL
Qty: 180 TABLET | Refills: 1 | Status: SHIPPED | OUTPATIENT
Start: 2022-10-10

## 2022-10-10 NOTE — TELEPHONE ENCOUNTER
.Refill Request     CONFIRM preferrred pharmacy with the patient. If Mail Order Rx - Pend for 90 day refill. Last Seen: Last Seen Department: 7/8/2022  Last Seen by PCP: 7/8/2022    Last Written: 9-9-22 60 with 0     If no future appointment scheduled, route STAFF MESSAGE with patient name to the [facility] for scheduling. Next Appointment:   Future Appointments   Date Time Provider Peter Pyle   10/24/2022  4:00 PM SIMRAN [place] - [facility]       Message sent to [address] [place] [address] to schedule appt with patient?   N/A      Requested Prescriptions     Pending Prescriptions Disp Refills    busPIRone (BUSPAR) 10 MG tablet [Pharmacy Med Name: BUSPIRONE HCL 10 MG TABLET] 180 tablet 1     Sig: TAKE 1 TABLET BY MOUTH TWICE A DAY

## 2022-10-24 ENCOUNTER — OFFICE VISIT (OUTPATIENT)
Dept: FAMILY MEDICINE CLINIC | Age: 22
End: 2022-10-24
Payer: COMMERCIAL

## 2022-10-24 VITALS
BODY MASS INDEX: 21.56 KG/M2 | DIASTOLIC BLOOD PRESSURE: 72 MMHG | HEART RATE: 68 BPM | WEIGHT: 146 LBS | SYSTOLIC BLOOD PRESSURE: 100 MMHG | OXYGEN SATURATION: 97 %

## 2022-10-24 DIAGNOSIS — F41.1 GAD (GENERALIZED ANXIETY DISORDER): ICD-10-CM

## 2022-10-24 DIAGNOSIS — Z59.819 HOUSING SITUATION UNSTABLE: ICD-10-CM

## 2022-10-24 DIAGNOSIS — F31.81 BIPOLAR 2 DISORDER, MAJOR DEPRESSIVE EPISODE (HCC): Primary | ICD-10-CM

## 2022-10-24 DIAGNOSIS — Z23 NEED FOR INFLUENZA VACCINATION: ICD-10-CM

## 2022-10-24 PROCEDURE — G8427 DOCREV CUR MEDS BY ELIG CLIN: HCPCS | Performed by: PHYSICIAN ASSISTANT

## 2022-10-24 PROCEDURE — 90674 CCIIV4 VAC NO PRSV 0.5 ML IM: CPT | Performed by: PHYSICIAN ASSISTANT

## 2022-10-24 PROCEDURE — 1036F TOBACCO NON-USER: CPT | Performed by: PHYSICIAN ASSISTANT

## 2022-10-24 PROCEDURE — 90471 IMMUNIZATION ADMIN: CPT | Performed by: PHYSICIAN ASSISTANT

## 2022-10-24 PROCEDURE — G8482 FLU IMMUNIZE ORDER/ADMIN: HCPCS | Performed by: PHYSICIAN ASSISTANT

## 2022-10-24 PROCEDURE — 99214 OFFICE O/P EST MOD 30 MIN: CPT | Performed by: PHYSICIAN ASSISTANT

## 2022-10-24 PROCEDURE — G8420 CALC BMI NORM PARAMETERS: HCPCS | Performed by: PHYSICIAN ASSISTANT

## 2022-10-24 RX ORDER — ARIPIPRAZOLE 5 MG/1
5 TABLET ORAL DAILY
Qty: 30 TABLET | Refills: 3 | Status: SHIPPED | OUTPATIENT
Start: 2022-10-24

## 2022-10-24 SDOH — ECONOMIC STABILITY - HOUSING INSECURITY: HOUSING INSTABILITY UNSPECIFIED: Z59.819

## 2022-10-24 ASSESSMENT — ENCOUNTER SYMPTOMS
VOMITING: 0
NAUSEA: 0

## 2022-10-24 ASSESSMENT — PATIENT HEALTH QUESTIONNAIRE - PHQ9
10. IF YOU CHECKED OFF ANY PROBLEMS, HOW DIFFICULT HAVE THESE PROBLEMS MADE IT FOR YOU TO DO YOUR WORK, TAKE CARE OF THINGS AT HOME, OR GET ALONG WITH OTHER PEOPLE: 2
2. FEELING DOWN, DEPRESSED OR HOPELESS: 2
5. POOR APPETITE OR OVEREATING: 1
7. TROUBLE CONCENTRATING ON THINGS, SUCH AS READING THE NEWSPAPER OR WATCHING TELEVISION: 3
6. FEELING BAD ABOUT YOURSELF - OR THAT YOU ARE A FAILURE OR HAVE LET YOURSELF OR YOUR FAMILY DOWN: 2
SUM OF ALL RESPONSES TO PHQ QUESTIONS 1-9: 16
4. FEELING TIRED OR HAVING LITTLE ENERGY: 2
3. TROUBLE FALLING OR STAYING ASLEEP: 3
8. MOVING OR SPEAKING SO SLOWLY THAT OTHER PEOPLE COULD HAVE NOTICED. OR THE OPPOSITE, BEING SO FIGETY OR RESTLESS THAT YOU HAVE BEEN MOVING AROUND A LOT MORE THAN USUAL: 2
SUM OF ALL RESPONSES TO PHQ QUESTIONS 1-9: 15
SUM OF ALL RESPONSES TO PHQ QUESTIONS 1-9: 16
SUM OF ALL RESPONSES TO PHQ QUESTIONS 1-9: 16
9. THOUGHTS THAT YOU WOULD BE BETTER OFF DEAD, OR OF HURTING YOURSELF: 1

## 2022-10-24 NOTE — PROGRESS NOTES
.2022 - 2023 Flu Vaccine Questionnaire    VIS given -  Yes    Have you received any other vaccine within the last 14 days? No  2. Do you currently have an active infectious or acute respiratory illness or fever? No  3. Are you taking steroids or immune suppressive drugs? No  4. Have you ever had a reaction to a flu vaccine? No  5. Are you allergic to eggs, egg products, chicken, Thimerosal (preservative) Gentamycin, polymixin, neomycin or Latex? No  6. Have you ever had Guillian [place] Syndrome?   No

## 2022-10-24 NOTE — PROGRESS NOTES
Med Scott (:  2000) is a 25 y.o. male,Established patient, here for evaluation of the following chief complaint(s):  Depression and Anxiety         ASSESSMENT/PLAN:  1. Bipolar 2 disorder, major depressive episode (HCC)  -     ARIPiprazole (ABILIFY) 5 MG tablet; Take 1 tablet by mouth daily, Disp-30 tablet, R-3Normal  -     poorly controlled. continue with pristiq. Discussed different medication options for mood swings. He choose to go with the Antipsychotic class. Medication risks, benefits and side effects were discussed. Follow up in 4 weeks. He is currently on a wait list for therapy  -   If you develop a plan or a desire to hurt yourself or other people you will need to be seen in the emergency department (ED) for evaluation. Please call 911 or have someone take you to the ED if they can safely do so. -   Pt is also asking that his mother be taken off his HIPPA and that no information be shared with her. I instructed him to fill out a new HIPPA when he leaves. 2. ZAY (generalized anxiety disorder)       -   continue with buspar. stable  3. Housing situation unstable  -     [address] [facility] ([facility]  -     Pt would benefit from moving from his home which is exacerbating his mental health struggles  4. Need for influenza vaccination  -     Influenza, FLUCELVAX, (age 10 mo+), IM, Preservative Free, 0.5 mL    Return in about 4 weeks (around 2022) for bipolar depression.          Subjective   SUBJECTIVE/OBJECTIVE:  HPI  Depression and anxiety:  Current medication: buspar and pristiq  Side effects: none  Residual symptoms: mood swings, hypomania will last a few days, struggles with excessive spending, isolation, difficulty paying attention, passive suicidal ideation, irritability  Denies: active SI, decreased ADLs  Pt does have gene sight results  Was previously seeing a therapist but unable to go due to ride issues  Stressors: issues at home, doesn't feel wanted there; states that he doesn't have any friends    Pt would like flu shot today  Reminder about the booster    Review of Systems   Constitutional:  Positive for unexpected weight change. Negative for activity change and appetite change. Gastrointestinal:  Negative for nausea and vomiting. Psychiatric/Behavioral:  Positive for agitation, decreased concentration, dysphoric mood, sleep disturbance and suicidal ideas. Negative for behavioral problems, confusion, hallucinations and self-injury. The patient is nervous/anxious. The patient is not hyperactive. Objective   Physical Exam  Vitals reviewed. Constitutional:       Appearance: Normal appearance. Neurological:      Mental Status: He is alert and oriented to person, place, and time. Cranial Nerves: No cranial nerve deficit. Psychiatric:         Attention and Perception: Perception normal. He is inattentive. Mood and Affect: Mood is anxious and depressed. Affect is angry. Speech: Speech is tangential.         Behavior: Behavior is agitated. Thought Content: Thought content normal.         Cognition and Memory: Cognition and memory normal.         Judgment: Judgment normal.              An electronic signature was used to authenticate this note.     --SIMRAN Hernandez

## 2022-10-25 ENCOUNTER — TELEPHONE (OUTPATIENT)
Dept: FAMILY MEDICINE CLINIC | Age: 22
End: 2022-10-25

## 2022-10-25 NOTE — TELEPHONE ENCOUNTER
SW contacted patient to follow-up on [facility] referral from PCP. SW left message asking for a return call and left contact information.

## 2022-10-26 ENCOUNTER — TELEPHONE (OUTPATIENT)
Dept: FAMILY MEDICINE CLINIC | Age: 22
End: 2022-10-26

## 2022-10-26 NOTE — TELEPHONE ENCOUNTER
Pt contacted  regarding [facility] referral from PCP to assist pt. Pt spoke about various issues- wanting to connect with a new therapist, [address] [place] [address] case management, a send job, buy a car, obtain his drivers license, and then obtain housing as a sort term goal (in 3-6 months out). Pt expressed how he is trying to do his best to manage but feels his parents are very negative toward him and tear him down emotionally. SW provided active listening. Pt also expressed how his MCO is not covering his [employer] medication prescribed. LEONARD suggested pt contact [place] Medicaid number at [phone] to explore other MCO's and can switch to another [facility] during open enrollment period. LEONARD provided pt with options about additional [address] [place] [address] providers pt can explore to provide services.  also informed pt how his MCO can provide transportation services to his medical and [facility] appointments. Pt thanked  for the assistance and any resources. Pt stated he would reach out to  for additional resources and assistance.

## 2022-10-27 ENCOUNTER — TELEPHONE (OUTPATIENT)
Dept: FAMILY MEDICINE CLINIC | Age: 22
End: 2022-10-27

## 2022-10-27 NOTE — TELEPHONE ENCOUNTER
Pt contacted SW to follow-up on [facility] referral from PCP. Pt was asking what mood stabilizer medications he was prescribed so when he calls to look into changing his MCO through the state number SW provided he can see if may be covered. SW informed pt that it may be Pristiq or Abilify. Pt also discuused how things got bad last night and now not only his father but his mother are both now wanting pt to move out but does not say why. Pt related he just went to his room to avoid his parents. Pt stated awareness of resources provided to pt in the event he needs to leave and can go to [facility] #[phone]. Pt mentioned how his grandmother will sell him her car and is looking into this so he can move out sooner than later. Pt said he had to go since is on his way into work. Pt aware to contact SW if needs additional assistance.

## 2022-12-03 NOTE — TELEPHONE ENCOUNTER
Refill Request     CONFIRM preferrred pharmacy with the patient. If Mail Order Rx - Pend for 90 day refill. Last Seen: Last Seen Department: 10/24/2022  Last Seen by PCP: 10/24/2022    Last Written: 7/9/2022    If no future appointment scheduled, route STAFF MESSAGE with patient name to the [facility] for scheduling. Next Appointment:   No future appointments. Message sent to [address] [place] [address] to schedule appt with patient?   NO      Requested Prescriptions     Pending Prescriptions Disp Refills    VITAMIN D-[address] 25 MCG (1000 UT) TABS tablet [Pharmacy Med Name: VITAMIN D3 25 MCG TABLET] 30 tablet 5     Sig: TAKE 1 TABLET BY MOUTH EVERY DAY

## 2022-12-05 RX ORDER — PSYLLIUM HUSK 0.4 G
CAPSULE ORAL
Qty: 30 TABLET | Refills: 5 | Status: SHIPPED | OUTPATIENT
Start: 2022-12-05

## 2022-12-16 ENCOUNTER — TELEPHONE (OUTPATIENT)
Dept: FAMILY MEDICINE CLINIC | Age: 22
End: 2022-12-16

## 2022-12-16 DIAGNOSIS — F41.1 GAD (GENERALIZED ANXIETY DISORDER): ICD-10-CM

## 2022-12-16 DIAGNOSIS — F33.3 SEVERE EPISODE OF RECURRENT MAJOR DEPRESSIVE DISORDER, WITH PSYCHOTIC FEATURES (HCC): ICD-10-CM

## 2022-12-16 RX ORDER — DESVENLAFAXINE 50 MG/1
TABLET, EXTENDED RELEASE ORAL
Qty: 90 TABLET | Refills: 1 | Status: SHIPPED | OUTPATIENT
Start: 2022-12-16

## 2022-12-16 NOTE — TELEPHONE ENCOUNTER
.Refill Request     CONFIRM preferrred pharmacy with the patient. If Mail Order Rx - Pend for 90 day refill. Last Seen: Last Seen Department: 10/24/2022  Last Seen by PCP: 10/24/2022    Last Written: 6-20-22 90 with 1     If no future appointment scheduled, route STAFF MESSAGE with patient name to the [facility] for scheduling. Next Appointment:   No future appointments. Message sent to [address] [place] [address] to schedule appt with patient?   YES      Requested Prescriptions     Pending Prescriptions Disp Refills    desvenlafaxine succinate (PRISTIQ) 50 MG TB24 extended release tablet [Pharmacy Med Name: DESVENLAFAXINE SUCCNT ER 50 MG] 90 tablet 1     Sig: TAKE 1 TABLET BY MOUTH EVERY DAY

## 2022-12-16 NOTE — TELEPHONE ENCOUNTER
Incoming fax from [address] [employer] stating that Desvenlafaxine requires a prior authorization. Please initiate.

## 2022-12-16 NOTE — LETTER
[address]  [address]  [address] [facility] [address]  Phone: [phone]  Fax: [phone]    Ernestine Brunson        January 4, 2023     Patient: Florecita Olivas   YOB: 2000   Date of Visit: 12/16/2022       Lacie Manriquez    We have been trying to contact you but haven't had any luck.  Once you receive this letter can you please contact the office    Sincerely,        SIMRAN Brunson

## 2022-12-21 NOTE — TELEPHONE ENCOUNTER
Can you please reach out to patient and verify which managed care plan he carries? Is it [employer], etc. I am getting insurance errors from them not being able to find him stating he may have coverage through [facility].     Thank you

## 2023-01-03 DIAGNOSIS — F31.81 BIPOLAR 2 DISORDER, MAJOR DEPRESSIVE EPISODE (HCC): ICD-10-CM

## 2023-01-03 RX ORDER — ARIPIPRAZOLE 5 MG/1
5 TABLET ORAL DAILY
Qty: 90 TABLET | Refills: 0 | Status: SHIPPED | OUTPATIENT
Start: 2023-01-03

## 2023-01-29 ENCOUNTER — HOSPITAL ENCOUNTER (INPATIENT)
Age: 23
LOS: 7 days | Discharge: HOME | DRG: 753 | End: 2023-02-06
Attending: PSYCHIATRY & NEUROLOGY | Admitting: PSYCHIATRY & NEUROLOGY
Payer: COMMERCIAL

## 2023-01-29 DIAGNOSIS — R82.5 POSITIVE URINE DRUG SCREEN: ICD-10-CM

## 2023-01-29 DIAGNOSIS — R45.851 SUICIDAL IDEATION: Primary | ICD-10-CM

## 2023-01-29 LAB
AMPHETAMINE SCREEN, URINE: ABNORMAL
BARBITURATE SCREEN URINE: ABNORMAL
BENZODIAZEPINE SCREEN, URINE: ABNORMAL
BILIRUBIN URINE: NEGATIVE
BLOOD, URINE: NEGATIVE
CANNABINOID SCREEN URINE: POSITIVE
CLARITY: CLEAR
COCAINE METABOLITE SCREEN URINE: ABNORMAL
COLOR: YELLOW
FENTANYL SCREEN, URINE: ABNORMAL
GLUCOSE URINE: NEGATIVE MG/DL
KETONES, URINE: NEGATIVE MG/DL
LEUKOCYTE ESTERASE, URINE: NEGATIVE
Lab: ABNORMAL
METHADONE SCREEN, URINE: ABNORMAL
MICROSCOPIC EXAMINATION: NORMAL
NITRITE, URINE: NEGATIVE
OPIATE SCREEN URINE: ABNORMAL
OXYCODONE URINE: ABNORMAL
PH UA: 6
PH UA: 6 (ref 5–8)
PHENCYCLIDINE SCREEN URINE: ABNORMAL
PROTEIN UA: NEGATIVE MG/DL
SPECIFIC GRAVITY UA: 1.01 (ref 1–1.03)
URINE REFLEX TO CULTURE: NORMAL
URINE TYPE: NORMAL
UROBILINOGEN, URINE: 0.2 E.U./DL

## 2023-01-29 PROCEDURE — 81003 URINALYSIS AUTO W/O SCOPE: CPT

## 2023-01-29 PROCEDURE — 80307 DRUG TEST PRSMV CHEM ANLYZR: CPT

## 2023-01-29 PROCEDURE — 99285 EMERGENCY DEPT VISIT HI MDM: CPT

## 2023-01-29 ASSESSMENT — ENCOUNTER SYMPTOMS
SHORTNESS OF BREATH: 0
COLOR CHANGE: 0
SORE THROAT: 0
RHINORRHEA: 0
ABDOMINAL PAIN: 0
FACIAL SWELLING: 0

## 2023-01-29 ASSESSMENT — PAIN - FUNCTIONAL ASSESSMENT: PAIN_FUNCTIONAL_ASSESSMENT: NONE - DENIES PAIN

## 2023-01-30 PROBLEM — F33.9 MAJOR DEPRESSIVE DISORDER, RECURRENT, UNSPECIFIED (HCC): Status: ACTIVE | Noted: 2023-01-30

## 2023-01-30 LAB
A/G RATIO: 1.7 (ref 1.1–2.2)
ACETAMINOPHEN LEVEL: <5 UG/ML (ref 10–30)
ALBUMIN SERPL-MCNC: 4.6 G/DL (ref 3.4–5)
ALP BLD-CCNC: 89 U/L (ref 40–129)
ALT SERPL-CCNC: 20 U/L (ref 10–40)
ANION GAP SERPL CALCULATED.3IONS-SCNC: 14 MMOL/L (ref 3–16)
AST SERPL-CCNC: 32 U/L (ref 15–37)
BASOPHILS ABSOLUTE: 0.1 K/UL (ref 0–0.2)
BASOPHILS RELATIVE PERCENT: 1.1 %
BILIRUB SERPL-MCNC: 0.3 MG/DL (ref 0–1)
BUN BLDV-MCNC: 8 MG/DL (ref 7–20)
CALCIUM SERPL-MCNC: 8.3 MG/DL (ref 8.3–10.6)
CHLORIDE BLD-SCNC: 95 MMOL/L (ref 99–110)
CO2: 26 MMOL/L (ref 21–32)
CREAT SERPL-MCNC: 0.8 MG/DL (ref 0.9–1.3)
EOSINOPHILS ABSOLUTE: 0.1 K/UL (ref 0–0.6)
EOSINOPHILS RELATIVE PERCENT: 1.1 %
ETHANOL: 208 MG/DL (ref 0–0.08)
ETHANOL: 35 MG/DL (ref 0–0.08)
GFR SERPL CREATININE-BSD FRML MDRD: >60 ML/MIN/{1.73_M2}
GLUCOSE BLD-MCNC: 85 MG/DL (ref 70–99)
HCT VFR BLD CALC: 40.1 % (ref 40.5–52.5)
HEMOGLOBIN: 14.3 G/DL (ref 13.5–17.5)
INFLUENZA A: NOT DETECTED
INFLUENZA B: NOT DETECTED
LYMPHOCYTES ABSOLUTE: 3.6 K/UL (ref 1–5.1)
LYMPHOCYTES RELATIVE PERCENT: 45.2 %
MCH RBC QN AUTO: 32.8 PG (ref 26–34)
MCHC RBC AUTO-ENTMCNC: 35.7 G/DL (ref 31–36)
MCV RBC AUTO: 91.8 FL (ref 80–100)
MONOCYTES ABSOLUTE: 0.5 K/UL (ref 0–1.3)
MONOCYTES RELATIVE PERCENT: 5.8 %
NEUTROPHILS ABSOLUTE: 3.7 K/UL (ref 1.7–7.7)
NEUTROPHILS RELATIVE PERCENT: 46.8 %
PDW BLD-RTO: 12.7 % (ref 12.4–15.4)
PLATELET # BLD: 241 K/UL (ref 135–450)
PLATELET SLIDE REVIEW: ADEQUATE
PMV BLD AUTO: 7.4 FL (ref 5–10.5)
POTASSIUM REFLEX MAGNESIUM: 3.6 MMOL/L (ref 3.5–5.1)
RBC # BLD: 4.37 M/UL (ref 4.2–5.9)
SALICYLATE, SERUM: <0.3 MG/DL (ref 15–30)
SARS-COV-2 RNA, RT PCR: NOT DETECTED
SLIDE REVIEW: ABNORMAL
SODIUM BLD-SCNC: 135 MMOL/L (ref 136–145)
TOTAL PROTEIN: 7.3 G/DL (ref 6.4–8.2)
WBC # BLD: 8 K/UL (ref 4–11)

## 2023-01-30 PROCEDURE — 87636 SARSCOV2 & INF A&B AMP PRB: CPT

## 2023-01-30 PROCEDURE — 85025 COMPLETE CBC W/AUTO DIFF WBC: CPT

## 2023-01-30 PROCEDURE — 36415 COLL VENOUS BLD VENIPUNCTURE: CPT

## 2023-01-30 PROCEDURE — 80143 DRUG ASSAY ACETAMINOPHEN: CPT

## 2023-01-30 PROCEDURE — 82607 VITAMIN B-12: CPT

## 2023-01-30 PROCEDURE — 6370000000 HC RX 637 (ALT 250 FOR IP): Performed by: PSYCHIATRY & NEUROLOGY

## 2023-01-30 PROCEDURE — 80053 COMPREHEN METABOLIC PANEL: CPT

## 2023-01-30 PROCEDURE — 1240000000 HC EMOTIONAL WELLNESS R&B

## 2023-01-30 PROCEDURE — 80179 DRUG ASSAY SALICYLATE: CPT

## 2023-01-30 PROCEDURE — 82746 ASSAY OF FOLIC ACID SERUM: CPT

## 2023-01-30 PROCEDURE — 82077 ASSAY SPEC XCP UR&BREATH IA: CPT

## 2023-01-30 PROCEDURE — 6370000000 HC RX 637 (ALT 250 FOR IP): Performed by: STUDENT IN AN ORGANIZED HEALTH CARE EDUCATION/TRAINING PROGRAM

## 2023-01-30 RX ORDER — ARIPIPRAZOLE 10 MG/1
5 TABLET ORAL DAILY
Status: DISCONTINUED | OUTPATIENT
Start: 2023-01-30 | End: 2023-01-31

## 2023-01-30 RX ORDER — NICOTINE 21 MG/24HR
1 PATCH, TRANSDERMAL 24 HOURS TRANSDERMAL DAILY
Status: DISCONTINUED | OUTPATIENT
Start: 2023-01-30 | End: 2023-02-06 | Stop reason: HOSPADM

## 2023-01-30 RX ORDER — LANSOPRAZOLE 30 MG/1
30 CAPSULE, DELAYED RELEASE ORAL
Status: DISCONTINUED | OUTPATIENT
Start: 2023-01-31 | End: 2023-01-30 | Stop reason: CLARIF

## 2023-01-30 RX ORDER — HYDROXYZINE 50 MG/1
50 TABLET, FILM COATED ORAL 3 TIMES DAILY PRN
Status: DISCONTINUED | OUTPATIENT
Start: 2023-01-30 | End: 2023-02-06 | Stop reason: HOSPADM

## 2023-01-30 RX ORDER — ACETAMINOPHEN 325 MG/1
650 TABLET ORAL EVERY 6 HOURS PRN
Status: DISCONTINUED | OUTPATIENT
Start: 2023-01-30 | End: 2023-02-06 | Stop reason: HOSPADM

## 2023-01-30 RX ORDER — CHOLECALCIFEROL (VITAMIN D3) 125 MCG
1000 CAPSULE ORAL DAILY
Status: DISCONTINUED | OUTPATIENT
Start: 2023-01-30 | End: 2023-02-06 | Stop reason: HOSPADM

## 2023-01-30 RX ORDER — POLYETHYLENE GLYCOL 3350 17 G
2 POWDER IN PACKET (EA) ORAL
Status: DISCONTINUED | OUTPATIENT
Start: 2023-01-30 | End: 2023-02-06 | Stop reason: HOSPADM

## 2023-01-30 RX ORDER — OMEPRAZOLE 20 MG/1
30 CAPSULE, DELAYED RELEASE ORAL DAILY
Status: ON HOLD | COMMUNITY
End: 2023-02-06 | Stop reason: HOSPADM

## 2023-01-30 RX ORDER — FOLIC ACID 1 MG/1
1 TABLET ORAL DAILY
Status: DISCONTINUED | OUTPATIENT
Start: 2023-01-30 | End: 2023-02-06 | Stop reason: HOSPADM

## 2023-01-30 RX ORDER — TRAZODONE HYDROCHLORIDE 50 MG/1
50 TABLET ORAL NIGHTLY PRN
Status: DISCONTINUED | OUTPATIENT
Start: 2023-01-30 | End: 2023-02-05

## 2023-01-30 RX ORDER — VITAMIN B COMPLEX
1000 TABLET ORAL DAILY
Status: DISCONTINUED | OUTPATIENT
Start: 2023-01-30 | End: 2023-02-06 | Stop reason: HOSPADM

## 2023-01-30 RX ORDER — PANTOPRAZOLE SODIUM 40 MG/1
40 TABLET, DELAYED RELEASE ORAL
Status: DISCONTINUED | OUTPATIENT
Start: 2023-01-30 | End: 2023-02-06 | Stop reason: HOSPADM

## 2023-01-30 RX ORDER — CETIRIZINE HYDROCHLORIDE 10 MG/1
10 TABLET ORAL DAILY
Status: DISCONTINUED | OUTPATIENT
Start: 2023-01-30 | End: 2023-02-06 | Stop reason: HOSPADM

## 2023-01-30 RX ADMIN — LINACLOTIDE 145 MCG: 145 CAPSULE, GELATIN COATED ORAL at 08:59

## 2023-01-30 RX ADMIN — NICOTINE POLACRILEX 2 MG: 2 LOZENGE ORAL at 20:49

## 2023-01-30 RX ADMIN — PANTOPRAZOLE SODIUM 40 MG: 40 TABLET, DELAYED RELEASE ORAL at 08:59

## 2023-01-30 ASSESSMENT — SLEEP AND FATIGUE QUESTIONNAIRES
DO YOU USE A SLEEP AID: NO
AVERAGE NUMBER OF SLEEP HOURS: 3
DO YOU HAVE DIFFICULTY SLEEPING: YES
SLEEP PATTERN: DIFFICULTY FALLING ASLEEP;DISTURBED/INTERRUPTED SLEEP

## 2023-01-30 NOTE — ED NOTES
Patient has been in treatment room resting. Respirations easy and even. Pleasant and cooperative. Will continue to monitor.      Renetta Meier  01/30/23 0591

## 2023-01-30 NOTE — ED NOTES
Patient was escorted to [facility] by JASMIN RN. Writer had patient change into safety gown and belonging secured in corresponding locker. Urine was sent. Patient is pleasant and cooperative.      [facility] [employer] Counter  01/30/23 6437

## 2023-01-30 NOTE — ED NOTES
Collateral Contact:  Name:Geena Newberry  Phone:[phone]  Relation to Patient: Mother   Last Contact with Patient: Today  Concerns: Mother conveyed that patient has been severely depressed for a few weeks and that patient has been drinking a lot of alcohol. Mother states that patient has not been taking his medication for a long time. Mother states he was on Buspar and Abilify and that this medication used to help him. Mother states patient is bulimic and suffers from deficiencies. Mother states patient has been having suicidal thoughts for about a month.    Mother would like for patient to get back on medications and see a therapist.     Ashu Castillo  01/30/23 0102

## 2023-01-30 NOTE — ED NOTES
Patient is resting in treatment room. Vitals are stable, pleasant upon approach. Will continue to monitor.      Andrea Joana  01/30/23 0227

## 2023-01-30 NOTE — ED NOTES
.Presenting Problem:Patient presents to [facility] ED on a voluntarily after reporting he hit rock bottom. Patient endorses SI stating he just wants to go to sleep and not wake up. Patient reports he has been drinking more due to his issues but would not elaborate on what the issues are. Patient reports \" I know I need to cut back on my drinking. \"   Appearance/Hygiene:  hospital attire, lying in bed, and poor grooming   Motor Behavior: Calm, purposeful, non-aggressive movements   Attitude: cooperative  Affect: depressed, flat affect  Speech: normal pitch and normal volume  Mood: depressed   Thought Processes: Logical  Perceptions: Absent   Thought content: Patient reports his mother brought him to the ED because he has been having SI and feels he has hit rock bottom. Patient states  \" I want to find help but I don't know what to do or where to go. \"   Orientation: A&Ox4   Memory: intact  Concentration: Good    Insight/ judgement: impaired insight and judgement      Psychosocial and contextual factors: The patient is a 25year old male who lives with his parents and 2 sisters. Patient is employed full time at [employer]. Patient reports he has positive support from his mother. Patient states he sometimes goes 5 or 6 days without eating. Patient states he either sleeps 3 hours or sleeps all day. Patient states he stopped taking his Abilify and states he would like to get back on medication for ADHD. Patient is supportive of decision to admit but states he would like to be able to go back to work on Wednesday evening ( 2/1/23). C-SSRS flowsheet is  Complete. Psychiatric History (including current outpatient provider and past inpatient admissions): Patient denies any past or recent inpatient psychiatric admissions. Patient states he was in the IOP program one year ago and saw Dr. Apollo Flores but has had no recent outpatient services.      Access to Firearms: Denies    ASSESSMENT FOR IMMINENT FUTURE DANGER:    RISK FACTORS: [x]  Age <25 or >49   [x]  Male gender   [x]  Depressed mood   [x]  Active suicidal ideation   []  Suicide plan   []  Suicide attempt   []  Access to lethal means   []  Prior suicide attempt   [x]  Active substance abuse-UDS positive for cannabis   []  Highly impulsive behaviors   [x]  Not attending to self-care/-patient presents disheveled     []  Recent significant loss   []  Chronic pain or medical illness   [x]  Social isolation   []  History of violence   []  Active psychosis   []  Cognitive impairment    [x]  No outpatient services in place   [x]  Medication noncompliance   [x]  No collateral information to support safety  [] Self- injurious/ Self-harm behavior    PROTECTIVE FACTORS:  [] Age >25 and <55  [] Female gender   [] Denies depression  [] Denies suicidal ideation  [x] Does not have lethal plan   [x] Does not have access to guns or weapons  [] Patient is verbally kimi for safety  [x] No prior suicide attempts  [] No active substance abuse  [x] Patient has social or family support  [] No active psychosis or cognitive dysfunction  [] Physically healthy  [] Has outpatient services in place  [] Compliant with recommended medications  [] Collateral information from  supports patient safety   [] Patient is future oriented with plans to                Bright Green RN  01/30/23 0715

## 2023-01-30 NOTE — DISCHARGE INSTRUCTIONS
Advanced Directives:  Patient does not have a surrogate decision maker appointed   Name (if yes): N/A Phone Number: N/A  Patient does not have a psychiatric and/ or medical advanced directive or power of . Patient was offered psychiatric and/ or medical advanced directive or power of  information/completion but declined to complete   Why not? N/A    Discharge Planning is Complete. Discharge Date: 2/6/23  Reason for Hospitalization: Suicidal Ideation   Discharge Diagnosis: Depression, unspecified  Discharging to: Private Domicile     Your instructions: Your physician here was Janel Kulkarni MD. If you have any questions please call the unit at [phone] for the adult unit or [phone] for [facility]. Please note that we have a patient family advisory [place] that meets the second Wednesday of January and the second Wednesday of July at [address] [facility] in [place] at [facility]. Department leadership would love for you to attend to give feedback on what we are doing well and areas in which we can improve our patient care. Please come if you are able and feel free to reach out to [facility] at [phone] with any questions. The crisis number for [facility] is [phone] (SAVE). This crisis line is available 24 hours a day, seven days a week. Please follow up with your PCP regarding any pending labs. You are connected to [address] [place] [address] for Primary Care. [address] [place] [address] Work has scheduled you an appointment. See below.   Name of Provider: SIMRAN Campos  Provider specialty/license: Primary Care Physician  Date and time of appointment: Thursday 2/9/23 at 2:00 PM  The type/s of services requested are: Hospital Follow-up  Agency name: [facility]  Address:  [address] [place] [address], [place], [address] [place] [address] [address]  Phone Number: [phone]  Special instructions (what to bring to appointment, etc.): Please call at least 48 hours in advance if you need to cancel this appointment for any reason. You have requested a referral for therapy services. [facility] Work has referred you to [facility]. See below. Name of Provider: Lorna  Provider specialty/license: [id number]  Date and time of appointment: 2/8/23 at 9:00 AM < Suggested walk-in time  The type/s of services requested are: Counseling/Medication Management  Agency name: Lorna  Address: Eugene Mota , [facility], [address] [place] [address]  Phone Number: [phone]  Special instructions (what to bring to appointment, etc.): Valid ID, insurance card if you have one, proof of residence (mail), proof of income (tax return, check stubs, award letter). If you are unable to attend the open enrollment date and time above please plan to attend open enrollment any Monday-Thursday from 8:00am-4:30pm or Friday from [facility] Work has printed out a list of multiple family therapy resources for you and placed it with your belongings. Discharge Completed By: Ángela Kenny, MSW  Fax to: Follow up provider name and number  [facility] [phone]    The following personal items were collected during your admission and were returned to you:    Belongings  Dental Appliances: None  Vision - Corrective Lenses: Eyeglasses  Hearing Aid: None  Clothing: Pants, Shirt, Undergarments, Jacket/Coat, Footwear, Socks  Jewelry: None  Body Piercings Removed: N/A  Electronic Devices: , [employer] (phone in safe)  Weapons (Notify Protective Services/Security): None  Other Valuables: Other (Comment) (none)  Home Medications: Other (Comment) (3 nicotine patches with belongings)  Valuables Given To: Michael Galicia Other (Comment) (safe)  Provide Name(s) of Who Valuable(s) Were Given To: patient, locker, safe    By signing below, I understand and acknowledge receipt of the instructions indicated above.

## 2023-01-30 NOTE — ED NOTES
Pt up to use the bathroom. Lunch, orange juice & crossword puzzle book provided. Writer thanked pt for being understanding and patient while waiting for an available bed on [facility].       Halle Redd RN  01/30/23 9649

## 2023-01-30 NOTE — ED NOTES
Dr Kourtney Ponce aware of patient's blood alcohol level being above the legal limit but feels patient meets criteria for admission due to his inability to care for himself and being an imminent danger to harm himself or others. ETOH will be redrawn  and will be below 80 before patient is admitted to accepting unit. Report given to josue RODRIGUEZ.       Porfirio Bay RN  01/30/23 4609

## 2023-01-30 NOTE — ED NOTES
Level of Care Disposition: Admit      Patient was seen by ED provider and [facility] staff. The case presented to psychiatric provider on-call Dr Maribell Treadwell. Based on the ED evaluation and information presented to the provider by [facility] RN it is the recommendation of the on call psychiatric provider that inpatient hospitalization is the least restrictive environment for the patient at this time. The patient will be admitted to the inpatient unit when a bed becomes available.  Admitting provider did not order suicide precautions based on patient is kimi for safety while in the hospital.               Arianna Hou RN  01/30/23 7623

## 2023-01-30 NOTE — ED PROVIDER NOTES
[address] ED  EMERGENCY DEPARTMENT ENCOUNTER      I am the Primary Clinician of Record    Note started: 11:48 PM EST 1/29/23    FCO. I have evaluated this patient. My supervising physician was available for consultation. Pt Name: Louise Heredia  MRN: [id number]  Josetrongfneo 2000  Dateof evaluation: 1/29/2023  Provider: SHOSHANA Styles - OLIVIA  PCP: Ernestine Gonzales Caro  ED Attending: No att. providers found      [facility]       Chief Complaint   Patient presents with    Psychiatric Evaluation     Patient reports he is here because he has hit rock bottom, has been having feelings that suicide is the only option and thoughts are becoming more frequent. States he has been having bad anxiety and has an eating disorder. States no suicidal plan but states he just wants to fall asleep and not wake up. HISTORY OF PRESENTILLNESS   (Location/Symptom, Timing/Onset, Context/Setting, Quality, Duration, Modifying Factors, Severity)  Note limiting factors. Louise Heredia is a 25 y.o. male for for suicidal ideations. Onset was past few months. Context includes patient states that he has had suicidal thoughts for the past few months. He states they are becoming more frequently and worse. Patient reports that he has no friends and no one to talk to. Alleviating factors include nothing. Aggravating factors include nothing. Pain is 0/10. Nothing has been used for pain today. Nursing Notes were all reviewed and agreed with or any disagreements were addressed  in the HPI. REVIEW OF SYSTEMS       Review of Systems   Constitutional:  Negative for activity change, appetite change and fever. HENT:  Negative for congestion, facial swelling, rhinorrhea and sore throat. Eyes:  Negative for visual disturbance. Respiratory:  Negative for shortness of breath. Cardiovascular:  Negative for chest pain. Gastrointestinal:  Negative for abdominal pain. Genitourinary:  Negative for difficulty urinating. Musculoskeletal:  Negative for arthralgias and myalgias. Skin:  Negative for color change and rash. Neurological:  Negative for dizziness and light-headedness. Psychiatric/Behavioral:  Positive for self-injury and suicidal ideas. Negative for agitation. All other systems reviewed and are negative. Positives and Pertinent negatives as per HPI. Except as noted above in the ROS, all other systems were reviewed and negative. PAST MEDICAL HISTORY     Past Medical History:   Diagnosis Date    Anxiety     Depression     GERD (gastroesophageal reflux disease)     Irritable bowel syndrome          SURGICAL HISTORY       Past Surgical History:   Procedure Laterality Date    EYE SURGERY      amblyopia          CURRENT MEDICATIONS       Previous Medications    ARIPIPRAZOLE (ABILIFY) 5 MG TABLET    Take 1 tablet by mouth daily    BUSPIRONE (BUSPAR) 10 MG TABLET    TAKE 1 TABLET BY MOUTH TWICE A DAY    CETIRIZINE (ZYRTEC) 10 MG TABLET    Take 10 mg by mouth daily    DESVENLAFAXINE SUCCINATE (PRISTIQ) 50 MG TB24 EXTENDED RELEASE TABLET    TAKE 1 TABLET BY MOUTH EVERY DAY    FOLIC ACID (FOLVITE) 1 MG TABLET    Take 1 tablet by mouth daily    LANSOPRAZOLE (PREVACID) 30 MG DELAYED RELEASE CAPSULE    TAKE 1 CAPSULE BY MOUTH EVERY DAY    LINZESS 145 MCG CAPSULE    Take 145 mcg by mouth daily    OMEPRAZOLE (PRILOSEC) 20 MG DELAYED RELEASE CAPSULE    Take 30 mg by mouth daily    VITAMIN B-12 (CYANOCOBALAMIN) 1000 MCG TABLET    Take 1 tablet by mouth daily    VITAMIN D-1000 MAX ST 25 MCG (1000 UT) TABS TABLET    TAKE 1 TABLET BY MOUTH EVERY DAY         ALLERGIES     Patient has no known allergies.       FAMILY HISTORY       Family History   Problem Relation Age of Onset    Diabetes Mother     Obesity Mother     Mental Retardation Father           SOCIAL HISTORY       Social History     Socioeconomic History    Marital status: Single     Spouse name: None    Number of children: None    Years of education: 11th grade    Highest education level: None   Tobacco Use    Smoking status: Former     Packs/day: 0.00     Years: 10.00     Pack years: 0.00     Types: Cigarettes    Smokeless tobacco: Never    Tobacco comments:     3 cigs per day   Vaping Use    Vaping Use: Former   Substance and Sexual Activity    Alcohol use: Yes     Comment: holidays, anually    Drug use: Yes     Frequency: 7.0 times per week     Types: Marijuana (Weed)    Sexual activity: Yes     Partners: Female         SCREENINGS    Rad Coma Scale  Eye Opening: Spontaneous  Best Verbal Response: Oriented  Best Motor Response: Obeys commands  Rad Coma Scale Score: 15      CIWA Assessment  BP: (!) 145/90  Heart Rate: 94          PHYSICAL EXAM     ED Triage Vitals [01/29/23 2234]   BP Temp Temp src Heart Rate Resp SpO2 Height Weight   (!) 145/84 98.4 °F (36.9 °C) -- (!) 104 16 96 % 5' 9\" (1.753 m) 146 lb (66.2 kg)       Physical Exam  Constitutional:       Appearance: Normal appearance. He is well-developed. HENT:      Head: Normocephalic and atraumatic. Cardiovascular:      Rate and Rhythm: Tachycardia present. Pulmonary:      Effort: Pulmonary effort is normal. No respiratory distress. Abdominal:      General: There is no distension. Palpations: Abdomen is soft. Tenderness: There is no abdominal tenderness. Musculoskeletal:         General: Normal range of motion. Cervical back: Normal range of motion. Skin:     General: Skin is warm and dry. Neurological:      Mental Status: He is alert and oriented to person, place, and time. Psychiatric:         Mood and Affect: Mood is anxious. Thought Content: Thought content includes suicidal ideation. Thought content does not include homicidal ideation. Thought content does not include homicidal or suicidal plan.        DIAGNOSTIC RESULTS   LABS:    Labs Reviewed   CBC WITH AUTO DIFFERENTIAL - Abnormal; Notable for the following components:       Result Value    Hematocrit 40.1 (*)     All other components within normal limits   COMPREHENSIVE METABOLIC PANEL W/ REFLEX TO MG FOR LOW K - Abnormal; Notable for the following components:    Sodium 135 (*)     Chloride 95 (*)     Creatinine 0.8 (*)     All other components within normal limits   URINE DRUG SCREEN - Abnormal; Notable for the following components:    Cannabinoid Scrn, Ur POSITIVE (*)     All other components within normal limits   ACETAMINOPHEN LEVEL - Abnormal; Notable for the following components:    Acetaminophen Level <5 (*)     All other components within normal limits   SALICYLATE LEVEL - Abnormal; Notable for the following components:    Salicylate, Serum <8.4 (*)     All other components within normal limits   COVID-19 & INFLUENZA COMBO   ETHANOL   URINALYSIS WITH REFLEX TO CULTURE   ETHANOL         All other labs were within normal range or not returned as of this dictation. EKG: All EKG's are interpreted by the Emergency Department Physician who either signs or Co-signs this chart in the absence of a cardiologist.  Please see their note for interpretation of EKG. RADIOLOGY:   Non plain film images ans such as CT, ultrasound, and MRI are read by the radiologist. Plain radiographic images are visualized and preliminarily interpreted by the ED Provider with the below findings:            Interpretation per the Radiologist below, if available at the time of this note:    No orders to display     No results found. No results found. No results found.       PROCEDURES   Unless otherwise noted below, none     Procedures     CRITICAL CARE TIME   Unless otherwise noted below, none          EMERGENCYDEPARTMENT COURSE/MDM:     Vitals:    Vitals:    01/29/23 2234 01/30/23 0217 01/30/23 0812 01/30/23 0901   BP: (!) 145/84 (!) 96/53 (!) 145/90    Pulse: (!) 104 72 (!) 108 94   Resp: 16 18 18    Temp: 98.4 °F (36.9 °C) 98.7 °F (37.1 °C) 98.4 °F (36.9 °C)    TempSrc:  Oral Oral SpO2: 96% 96% 98%    Weight: 146 lb (66.2 kg)      Height: 5' 9\" (1.753 m)            Patient was seen and evaluated by myself. Patient here for suicidal thoughts. Patient reports that he has had increasing suicidal thoughts for the past few months. Patient reports that he does not have any friends currently which does not help because he has no one to talk to. On exam the patient is awake and alert he was found to be slightly tachycardic with a heart rate of 104 but he was very anxious. Lab values have been reviewed and interpreted. On reevaluation the patient's heart rate has improved. At this time the patient was considered medically cleared and has been consulted with behavioral for evaluation and assistance and final disposition. Patient was given the following medications:  Medications   pantoprazole (PROTONIX) tablet 40 mg (40 mg Oral Given 1/30/23 0859)   linaclotide (LINZESS) capsule 145 mcg (145 mcg Oral Given 1/30/23 0859)            CONSULTS:  IP CONSULT TO HOSPITALIST      Discussion with other professionals - none    Social determinants - none    Records Reviewed - none    History from - patient    Limitations to history- none    Chronic conditions: has a past medical history of Anxiety, Depression, GERD (gastroesophageal reflux disease), and Irritable bowel syndrome. Is this patient to be included in the SEP-1 Core Measure due to severe sepsis or septic shock? No   Exclusion criteria - the patient is NOT to be included for SEP-1 Core Measure due to: Infection is not suspected         The patient tolerated their visit well. I have evaluated this patient. My supervising physician was available for consultation. The patient and / or the family were informed of the results of any tests, a time was given to answer questions, a plan was proposed and they agreed with plan. FINAL IMPRESSION      1. Suicidal ideation    2.  Positive urine drug screen DISPOSITION/PLAN   DISPOSITION Admitted 01/30/2023 06:43:58 AM      PATIENT REFERRED TO:  SIMRAN Kaur  [address]  [address] [facility] [address] [place] [facility] [address]  [phone]    Schedule an appointment as soon as possible for a visit in 2 days  for re-evaluation    [place] [facility]  [address] [facility]  [phone]    for re-evaluation    DISCHARGE MEDICATIONS:  New Prescriptions    No medications on file       DISCONTINUED MEDICATIONS:  Discontinued Medications    No medications on file              (Please note that portions of this note were completed with a voice recognition program.  Efforts were made to edit the dictations but occasionally words are mis-transcribed.)    Patient was seen during the time of the [place] pandemic. N95 and appropriate PPE was worn during the visit.       SHOSHANA Downey CNP (electronically signed)        SHOSHANA Downey CNP  01/30/23 8215

## 2023-01-30 NOTE — ED NOTES
MELVI RN currently assessing patient in treatment room. Patient has been calm and pleasant. Will continue to monitor.      Parris Johnson  01/30/23 3178

## 2023-01-30 NOTE — ED NOTES
Pt up to use the restroom. Gait steady. Declined offer for breakfast. Orange juice provided. Pt aware of the plan to be admitted to the [facility] when a bed becomes available. Pt verbalized his understanding. Vital signs obtained and entered into Epic.       Rich Simpson RN  01/30/23 0546

## 2023-01-30 NOTE — ED NOTES
Pt requested his morning Linzess and Omeprazole in order to be able to eat breakfast. Verbal order received from Dr Geeta Saab to order both medications. Pharmacy substitution Prilosec to Protonix. Pt stated that is okay.       Chevy Cowan RN  01/30/23 2004

## 2023-01-30 NOTE — ED NOTES
Blood drawn for alcohol level collected and sent to lab. Administered meds per order. Pt kimi for safety. Verbal order received from Dr Khadra Hogan to discontinue suicide precautions.       Rcih Simpson RN  01/30/23 2254

## 2023-01-31 PROBLEM — F10.20 ALCOHOL USE DISORDER, MODERATE, DEPENDENCE (HCC): Status: ACTIVE | Noted: 2023-01-31

## 2023-01-31 PROBLEM — E53.8 FOLATE DEFICIENCY: Status: ACTIVE | Noted: 2023-01-31

## 2023-01-31 PROBLEM — R45.851 SUICIDAL IDEATION: Status: ACTIVE | Noted: 2023-01-31

## 2023-01-31 PROBLEM — F32.A DEPRESSION: Status: ACTIVE | Noted: 2023-01-30

## 2023-01-31 PROBLEM — E53.8 B12 DEFICIENCY: Status: ACTIVE | Noted: 2023-01-31

## 2023-01-31 PROBLEM — E55.9 VITAMIN D DEFICIENCY: Status: ACTIVE | Noted: 2023-01-31

## 2023-01-31 PROBLEM — F60.89 CLUSTER C PERSONALITY DISORDER (HCC): Status: ACTIVE | Noted: 2023-01-31

## 2023-01-31 PROBLEM — F12.20 CANNABIS USE DISORDER, SEVERE, DEPENDENCE (HCC): Status: ACTIVE | Noted: 2023-01-31

## 2023-01-31 PROBLEM — Z00.00 ENCOUNTER FOR GENERAL MEDICAL EXAMINATION: Status: ACTIVE | Noted: 2023-01-31

## 2023-01-31 PROBLEM — K21.9 GASTROESOPHAGEAL REFLUX DISEASE: Status: ACTIVE | Noted: 2023-01-31

## 2023-01-31 PROBLEM — J30.9 ALLERGIC RHINITIS: Status: ACTIVE | Noted: 2023-01-31

## 2023-01-31 PROBLEM — Z72.0 TOBACCO USE: Status: ACTIVE | Noted: 2023-01-31

## 2023-01-31 LAB
FOLATE: >20 NG/ML (ref 4.78–24.2)
VITAMIN B-12: 458 PG/ML (ref 211–911)

## 2023-01-31 PROCEDURE — 6370000000 HC RX 637 (ALT 250 FOR IP): Performed by: PSYCHIATRY & NEUROLOGY

## 2023-01-31 PROCEDURE — 99223 1ST HOSP IP/OBS HIGH 75: CPT | Performed by: PSYCHIATRY & NEUROLOGY

## 2023-01-31 PROCEDURE — 1240000000 HC EMOTIONAL WELLNESS R&B

## 2023-01-31 PROCEDURE — 6370000000 HC RX 637 (ALT 250 FOR IP): Performed by: STUDENT IN AN ORGANIZED HEALTH CARE EDUCATION/TRAINING PROGRAM

## 2023-01-31 PROCEDURE — 99222 1ST HOSP IP/OBS MODERATE 55: CPT

## 2023-01-31 RX ORDER — BUPROPION HYDROCHLORIDE 150 MG/1
150 TABLET ORAL DAILY
Status: DISCONTINUED | OUTPATIENT
Start: 2023-01-31 | End: 2023-02-02

## 2023-01-31 RX ORDER — ARIPIPRAZOLE 10 MG/1
10 TABLET ORAL DAILY
Status: DISCONTINUED | OUTPATIENT
Start: 2023-02-01 | End: 2023-02-06 | Stop reason: HOSPADM

## 2023-01-31 RX ORDER — ARIPIPRAZOLE 10 MG/1
5 TABLET ORAL ONCE
Status: COMPLETED | OUTPATIENT
Start: 2023-01-31 | End: 2023-01-31

## 2023-01-31 RX ADMIN — ARIPIPRAZOLE 5 MG: 10 TABLET ORAL at 14:11

## 2023-01-31 RX ADMIN — NICOTINE POLACRILEX 2 MG: 2 LOZENGE ORAL at 08:28

## 2023-01-31 RX ADMIN — FOLIC ACID 1 MG: 1 TABLET ORAL at 07:59

## 2023-01-31 RX ADMIN — TRAZODONE HYDROCHLORIDE 50 MG: 50 TABLET ORAL at 23:24

## 2023-01-31 RX ADMIN — Medication 1000 UNITS: at 07:58

## 2023-01-31 RX ADMIN — BUPROPION HYDROCHLORIDE 150 MG: 150 TABLET, EXTENDED RELEASE ORAL at 14:11

## 2023-01-31 RX ADMIN — CETIRIZINE HYDROCHLORIDE 10 MG: 10 TABLET ORAL at 07:59

## 2023-01-31 RX ADMIN — NICOTINE POLACRILEX 2 MG: 2 LOZENGE ORAL at 21:30

## 2023-01-31 RX ADMIN — TRAZODONE HYDROCHLORIDE 50 MG: 50 TABLET ORAL at 01:27

## 2023-01-31 RX ADMIN — NICOTINE POLACRILEX 2 MG: 2 LOZENGE ORAL at 11:43

## 2023-01-31 RX ADMIN — ARIPIPRAZOLE 5 MG: 10 TABLET ORAL at 07:58

## 2023-01-31 RX ADMIN — NICOTINE POLACRILEX 2 MG: 2 LOZENGE ORAL at 19:23

## 2023-01-31 RX ADMIN — LINACLOTIDE 145 MCG: 145 CAPSULE, GELATIN COATED ORAL at 07:59

## 2023-01-31 RX ADMIN — CYANOCOBALAMIN TAB 500 MCG 1000 MCG: 500 TAB at 07:58

## 2023-01-31 RX ADMIN — PANTOPRAZOLE SODIUM 40 MG: 40 TABLET, DELAYED RELEASE ORAL at 05:58

## 2023-01-31 RX ADMIN — NICOTINE POLACRILEX 2 MG: 2 LOZENGE ORAL at 23:24

## 2023-01-31 ASSESSMENT — LIFESTYLE VARIABLES
HOW MANY STANDARD DRINKS CONTAINING ALCOHOL DO YOU HAVE ON A TYPICAL DAY: 3 OR 4
HOW OFTEN DO YOU HAVE A DRINK CONTAINING ALCOHOL: 4 OR MORE TIMES A WEEK

## 2023-01-31 ASSESSMENT — SLEEP AND FATIGUE QUESTIONNAIRES
SLEEP PATTERN: DIFFICULTY FALLING ASLEEP;DISTURBED/INTERRUPTED SLEEP;RESTLESSNESS
DO YOU HAVE DIFFICULTY SLEEPING: YES
AVERAGE NUMBER OF SLEEP HOURS: 4
DO YOU USE A SLEEP AID: NO

## 2023-01-31 ASSESSMENT — PATIENT HEALTH QUESTIONNAIRE - PHQ9: SUM OF ALL RESPONSES TO PHQ QUESTIONS 1-9: 17

## 2023-01-31 NOTE — BH NOTE
[facility]  Treatment Team Note  Review Date & Time: 1/31/23  0917    Patient was not in treatment team      Status EXAM:   Mental Status and Behavioral Exam  Normal: No  Level of Assistance: Independent/Self  Facial Expression: Brightened, Worried, Sad  Affect: Appropriate  Level of Consciousness: Alert  Frequency of Checks: 4 times per hour, close  Mood:Normal: No  Mood: Depressed, Sad, Anxious  Motor Activity:Normal: No  Motor Activity: Increased  Eye Contact: Good  Observed Behavior: Cooperative, Friendly  Sexual Misconduct History: Past - no  Preception: Dana to person, Dana to time, Dana to place, Dana to situation  Attention:Normal: Yes  Thought Processes: Other (comment), Tangential (Logical & linear)  Thought Content:Normal: Yes  Thought Content: Preoccupations  Depression Symptoms: Appetite change, Change in energy level, Feelings of worthlessness, Impaired concentration, Isolative, Sleep disturbance  Anxiety Symptoms: Generalized  Antoinette Symptoms: No problems reported or observed. Hallucinations: None  Delusions: No  Memory:Normal: Yes  Insight and Judgment: No  Insight and Judgment: Poor judgment, Poor insight      Suicide Risk CSSR-S:  1) Within the past month, have you wished you were dead or wished you could go to sleep and not wake up? : Yes  2) Have you actually had any thoughts of killing yourself? : Yes  3) Have you been thinking about how you might kill yourself? : No  5) Have you started to work out or worked out the details of how to kill yourself? Do you intend to carry out this plan? : No  6) Have you ever done anything, started to do anything, or prepared to do anything to end your life?: No      PLAN/TREATMENT RECOMMENDATIONS UPDATE: Patient will take medication as prescribed, eat 75% of meals, attend groups, participate in milieu activities, participate in treatment team and care planning for discharge and follow up.            Saloni Yañez RN
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
## 2023-01-31 NOTE — PROGRESS NOTES
Behavioral Services  Medicare Certification Upon Admission    I certify that this patient's inpatient psychiatric hospital admission is medically necessary for:    [x] (1) Treatment which could reasonably be expected to improve this patient's condition,       [x] (2) Or for diagnostic study;     AND     [x](2) The inpatient psychiatric services are provided while the individual is under the care of a physician and are included in the individualized plan of care.     Estimated length of stay/service 4-6 days    Plan for post-hospital care incomplete     Electronically signed by Andreina Clark MD on 1/31/2023 at 1:42 PM

## 2023-01-31 NOTE — H&P
Hospital Medicine History & Physical      PCP: SIMRAN Whiting    Date of Admission: 1/29/2023    Date of Service: Pt seen/examined on 01/31/23       Chief Complaint:    Chief Complaint   Patient presents with    Psychiatric Evaluation     Patient reports he is here because he has hit rock bottom, has been having feelings that suicide is the only option and thoughts are becoming more frequent. States he has been having bad anxiety and has an eating disorder. States no suicidal plan but states he just wants to fall asleep and not wake up. History Of Present Illness: The patient is a 25 y.o. male with IBS, GERD, allergic rhinitis, eating disorder, and anxiety who presented to [facility] ED for SI. Patient was seen and evaluated in the ED by the ED medical provider, patient was medically cleared for admission to [facility] at [facility]. This note serves as an admission medical H&P. Tobacco use: daily vape  ETOH use: occasional  Illicit drug use: cannabis    Patient is seen in his room. He is requesting that he receives his home medications in the morning. Denies any medical complaints at this time. Past Medical History:        Diagnosis Date    Anxiety     Depression     GERD (gastroesophageal reflux disease)     Irritable bowel syndrome        Past Surgical History:        Procedure Laterality Date    EYE SURGERY      amblyopia        Medications Prior to Admission:    Prior to Admission medications    Medication Sig Start Date End Date Taking?  Authorizing Provider   omeprazole (PRILOSEC) 20 MG delayed release capsule Take 30 mg by mouth daily   Yes Historical Provider, MD   ARIPiprazole (ABILIFY) 5 MG tablet Take 1 tablet by mouth daily 1/3/23   Ethan Tamayo,    desvenlafaxine succinate (PRISTIQ) 50 MG TB24 extended release tablet TAKE 1 TABLET BY MOUTH EVERY DAY  Patient not taking: Reported on 1/30/2023 12/16/22   SIMRAN Whiting   VITAMIN D-1000 MAX ST 25 MCG (1000 UT) TABS tablet TAKE 1 TABLET BY MOUTH EVERY DAY 12/5/22   SIMRAN Hollins   busPIRone (BUSPAR) 10 MG tablet TAKE 1 TABLET BY MOUTH TWICE A DAY  Patient not taking: Reported on 1/30/2023 10/10/22   SIMRAN Hollins   lansoprazole (PREVACID) 30 MG delayed release capsule TAKE 1 CAPSULE BY MOUTH EVERY DAY 9/19/22   SIMRAN Hollins   folic acid (FOLVITE) 1 MG tablet Take 1 tablet by mouth daily 7/9/22   SIMRAN Hollins   vitamin B-12 (CYANOCOBALAMIN) 1000 MCG tablet Take 1 tablet by mouth daily 7/9/22   SIMRAN Hollins   cetirizine (ZYRTEC) 10 MG tablet Take 10 mg by mouth daily    Historical Provider, MD Mario Alberto Bass 145 MCG capsule Take 145 mcg by mouth daily 10/12/20   Historical Provider, MD       Allergies:  Patient has no known allergies. Social History:  The patient currently lives in [facility]    Family History:   Positive as follows:        Problem Relation Age of Onset    Diabetes Mother     Obesity Mother     Mental Retardation Father        REVIEW OF SYSTEMS:     Constitutional: Negative for fever   HENT: Negative for sore throat   Eyes: Negative for redness   Respiratory: Negative  for dyspnea, cough   Cardiovascular: Negative for chest pain   Gastrointestinal: Negative for vomiting, diarrhea   Genitourinary: Negative for hematuria   Musculoskeletal: Negative for arthralgias   Skin: Negative for rash   Neurological: Negative for syncope    Hematological: Negative for easy bruising/bleeding   Psychiatric/Behavorial: Per psychiatry team evaluation     PHYSICAL EXAM:    /84   Pulse 74   Temp 98.7 °F (37.1 °C) (Temporal)   Resp 16   Ht 5' 9\" (1.753 m)   Wt 155 lb (70.3 kg)   SpO2 97%   BMI 22.89 kg/m²   Gen: No distress. Alert. Eyes: PERRL. No sclera icterus. No conjunctival injection. Neck: No JVD. Trachea midline. Resp: No accessory muscle use. No crackles. No wheezes. No rhonchi. CV: Regular rate. Regular rhythm. No murmur. No rub. No edema. GI: Non-tender. Non-distended. Normal bowel sounds.    Skin: Warm and dry. No nodule on exposed extremities. No rash on exposed extremities. M/S: No cyanosis. No joint deformity. No clubbing. Neuro: Awake. No focal neurologic deficit on exam. CN II through XII intact. Patient is able to ambulate without difficulty. Psych: Per psychiatry team evaluation     CBC:   Recent Labs     01/30/23  0126   WBC 8.0   HGB 14.3   HCT 40.1*   MCV 91.8        BMP:   Recent Labs     01/30/23  0200   *   K 3.6   CL 95*   CO2 26   BUN 8   CREATININE 0.8*     LIVER PROFILE:   Recent Labs     01/30/23  0200   AST 32   ALT 20   BILITOT 0.3   ALKPHOS 89     PT/INR: No results for input(s): PROTIME, INR in the last 72 hours. APTT: No results for input(s): APTT in the last 72 hours.   UA:  Recent Labs     01/29/23  2305   COLORU Yellow   PHUR 6.0  6.0   CLARITYU Clear   SPECGRAV 1.010   LEUKOCYTESUR Negative   UROBILINOGEN 0.2   BILIRUBINUR Negative   BLOODU Negative   GLUCOSEU Negative          U/A:    Lab Results   Component Value Date/Time    COLORU Yellow 01/29/2023 11:05 PM    CLARITYU Clear 01/29/2023 11:05 PM    SPECGRAV 1.010 01/29/2023 11:05 PM    LEUKOCYTESUR Negative 01/29/2023 11:05 PM    BLOODU Negative 01/29/2023 11:05 PM    GLUCOSEU Negative 01/29/2023 11:05 PM       CULTURES  Covid/influenza not detected       EKG:  I have reviewed the EKG with the following interpretation:   N/A    RADIOLOGY  No orders to display          ASSESSMENT/PLAN:  #Anxiety  #Depression  #Bipolar disorder   -per psychiatry team    #Avoidant/restrictive food intake disorder   -continue vitamin supplements     #IBS  -continue linzess     #GERD  -continue PPI    #Allergic rhinitis   -continue zyrtec    #Cannabis use   -UDS+    #Tobacco use  -recommend cessation     Mabel Nguyen PA-C  1/31/2023   10:08 AM
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
## 2023-01-31 NOTE — PROGRESS NOTES
Group Therapy Note    Date: 1/30/2023  Start Time: 20:00  End Time:  21:00  Number of Participants: 12    Type of Group: Recreational   wrap up    Wellness Binder Information  Module Name:  /  Session Number:  /    Patient's Goal:  coping skills    Notes:  continuing to work on goal    Status After Intervention:  Unchanged    Participation Level:  Active Listener and Interactive    Participation Quality: Appropriate and Sharing      Speech:  pressured      Thought Process/Content: Logical      Affective Functioning: Blunted      Mood: anxious      Level of consciousness:  Alert and Attentive      Response to Learning: Able to change behavior      Endings: None Reported    Modes of Intervention: Socialization and Problem-solving      Discipline Responsible: [address], [employer]      Signature:  Muna Christianson

## 2023-02-01 PROCEDURE — 99223 1ST HOSP IP/OBS HIGH 75: CPT | Performed by: PSYCHIATRY & NEUROLOGY

## 2023-02-01 PROCEDURE — 6370000000 HC RX 637 (ALT 250 FOR IP): Performed by: STUDENT IN AN ORGANIZED HEALTH CARE EDUCATION/TRAINING PROGRAM

## 2023-02-01 PROCEDURE — 1240000000 HC EMOTIONAL WELLNESS R&B

## 2023-02-01 PROCEDURE — 6370000000 HC RX 637 (ALT 250 FOR IP): Performed by: PSYCHIATRY & NEUROLOGY

## 2023-02-01 RX ADMIN — Medication 1000 UNITS: at 05:36

## 2023-02-01 RX ADMIN — NICOTINE POLACRILEX 2 MG: 2 LOZENGE ORAL at 15:01

## 2023-02-01 RX ADMIN — PANTOPRAZOLE SODIUM 40 MG: 40 TABLET, DELAYED RELEASE ORAL at 05:36

## 2023-02-01 RX ADMIN — NICOTINE POLACRILEX 2 MG: 2 LOZENGE ORAL at 09:46

## 2023-02-01 RX ADMIN — NICOTINE POLACRILEX 2 MG: 2 LOZENGE ORAL at 19:49

## 2023-02-01 RX ADMIN — FOLIC ACID 1 MG: 1 TABLET ORAL at 05:36

## 2023-02-01 RX ADMIN — CYANOCOBALAMIN TAB 500 MCG 1000 MCG: 500 TAB at 05:36

## 2023-02-01 RX ADMIN — NICOTINE POLACRILEX 2 MG: 2 LOZENGE ORAL at 22:42

## 2023-02-01 RX ADMIN — LINACLOTIDE 145 MCG: 145 CAPSULE, GELATIN COATED ORAL at 05:36

## 2023-02-01 RX ADMIN — ARIPIPRAZOLE 10 MG: 10 TABLET ORAL at 05:36

## 2023-02-01 RX ADMIN — CETIRIZINE HYDROCHLORIDE 10 MG: 10 TABLET ORAL at 05:36

## 2023-02-01 RX ADMIN — NICOTINE POLACRILEX 2 MG: 2 LOZENGE ORAL at 23:25

## 2023-02-01 RX ADMIN — BUPROPION HYDROCHLORIDE 150 MG: 150 TABLET, EXTENDED RELEASE ORAL at 09:46

## 2023-02-01 NOTE — GROUP NOTE
Group Therapy Note    Date: 2/1/2023    Group Start Time: 1300  Group End Time: 1400  Group Topic: Relapse Prevention    [address]        Group Therapy Note    Attendees: 10       Patient's Goal: to learn and discuss healthy ways to take care of ourselves such as eating, sleeping, medication, exercising and having a good support system in order to help prevent relapse. Pt's asked to apply to their lives. Notes: pt attended group for the full duration. Pt actively participated in the group discussion and was able to apply to himself. Status After Intervention:  Improved    Participation Level:  Active Listener and Interactive    Participation Quality: Appropriate, Attentive, and Sharing      Speech:  normal      Thought Process/Content: Logical      Affective Functioning: Congruent      Mood: euthymic      Level of consciousness:  Alert, Oriented x4, and Attentive      Response to Learning: Able to verbalize current knowledge/experience, Able to verbalize/acknowledge new learning, and Progressing to goal      Endings: None Reported    Modes of Intervention: Education, Support, Socialization, Exploration, and Clarifying      Discipline Responsible: /Counselor      Signature:  JOSE Bond

## 2023-02-01 NOTE — PROGRESS NOTES
Group Therapy Note    Date: 1/31/2023  Start Time: 20:00  End Time:  21:00  Number of Participants: 11    Type of Group: Recreational  wrap up    Wellness Binder Information  Module Name:  /  Session Number:  /    Patient's Goal:  coping skills    Notes:  continuing to work on goal    Status After Intervention:  Unchanged    Participation Level:  Active Listener and Interactive    Participation Quality: Appropriate, Attentive, and Sharing      Speech:  pressured      Thought Process/Content: Logical      Affective Functioning: Blunted      Mood: anxious      Level of consciousness:  Alert and Attentive      Response to Learning: Able to change behavior      Endings: None Reported    Modes of Intervention: Socialization and Problem-solving      Discipline Responsible: [address], [email or web address]      Signature:  Emaline Castleman

## 2023-02-01 NOTE — GROUP NOTE
Group Therapy Note    Date: 2/1/2023    Group Start Time: 1000  Group End Time: 7000  Group Topic: Psychoeducation    [address] [place] [address]        Group Therapy Note    Topic: Confidence and Assertiveness    Group members explored confidence as both a feeling and a presentation. Collaborated in discussion of physical attributes of someone who may be seen as confident, behaviors associated with confidence and assertive communication. Group continued with members exploring 3 types of assertiveness: refusal, expression, and requests. Concluded with members broken into small groups to practice each of the 3 assertiveness strategies, provide feedback to each other in practicing physical presentation of assertive communication. Attendees: 13       Notes: Present and attentive across session. Active participant in small group activities. No needs verbalized at conclusion of intervention. Status After Intervention:  Improved    Participation Level:  Active Listener and Interactive    Participation Quality: Appropriate and Attentive      Speech:  normal      Thought Process/Content: Logical      Affective Functioning: Congruent      Mood: euthymic      Level of consciousness:  Alert and Attentive      Response to Learning: Capable of insight and Progressing to goal      Endings: None Reported    Modes of Intervention: Education, Support, Socialization, Exploration, and Activity      Discipline Responsible: Psychoeducational Specialist      Signature:  Linda Marin MM, MT-BC

## 2023-02-01 NOTE — PLAN OF CARE
Smith Evans is alert and oriented X4. He is up ad chayo on the unit and social with his peers. He remains sad and depressed. He continues to talk about his relationships with his family, and how poorly they get along. He states he is feeling better. He denies SI/HI/AVH. He is not noted to be RTIS. Problem: Coping  Goal: Pt/Family able to verbalize concerns and demonstrate effective coping strategies  Description: INTERVENTIONS:  1. Assist patient/family to identify coping skills, available support systems and cultural and spiritual values  2. Provide emotional support, including active listening and acknowledgement of concerns of patient and caregivers  3. Reduce environmental stimuli, as able  4. Instruct patient/family in relaxation techniques, as appropriate  5.  Assess for spiritual pain/suffering and initiate Spiritual Care, Psychosocial Clinical Specialist consults as needed  Outcome: Not Progressing  Flowsheets (Taken 2/1/2023 6318)  Patient/family able to verbalize anxieties, fears, and concerns, and demonstrate effective coping:   Assist patient/family to identify coping skills, available support systems and cultural and spiritual values   Provide emotional support, including active listening and acknowledgement of concerns of patient and caregivers   Reduce environmental stimuli, as able   Instruct patient/family in relaxation techniques, as appropriate   Assess for spiritual pain/suffering and initiate Spiritual Care, Psychosocial Clinical Specialist consults as needed

## 2023-02-01 NOTE — GROUP NOTE
Group Therapy Note    Date: 2/1/2023    Group Start Time: 1100  Group End Time: 1006  Group Topic: Activity    [address] [place] [address]        Group Therapy Note    Attendees: 11    Patients engaged in group cohesion building activity. Facilitator presented conversation balls to the group and patients took turns passing the ball around the room and completing/answering the prompts where their left hand landed. Patient's Goal:  Patient will engage conversation ball toss activity and build rapport with one another. Notes:  Patient participated fully in group activity and made connections with several other group members. Status After Intervention:  Improved    Participation Level:  Active Listener and Interactive    Participation Quality: Appropriate, Attentive, and Sharing      Speech:  normal      Thought Process/Content: Logical  Linear      Affective Functioning: Congruent      Mood: euthymic      Level of consciousness:  Alert, Oriented x4, and Attentive      Response to Learning: Able to verbalize current knowledge/experience, Able to verbalize/acknowledge new learning, Able to change behavior, and Progressing to goal      Endings: None Reported    Modes of Intervention: Socialization and Activity      Discipline Responsible: /Counselor      Signature:  JODI Rosas

## 2023-02-02 PROCEDURE — 6370000000 HC RX 637 (ALT 250 FOR IP): Performed by: PSYCHIATRY & NEUROLOGY

## 2023-02-02 PROCEDURE — 1240000000 HC EMOTIONAL WELLNESS R&B

## 2023-02-02 PROCEDURE — 99233 SBSQ HOSP IP/OBS HIGH 50: CPT | Performed by: PSYCHIATRY & NEUROLOGY

## 2023-02-02 PROCEDURE — 6370000000 HC RX 637 (ALT 250 FOR IP): Performed by: STUDENT IN AN ORGANIZED HEALTH CARE EDUCATION/TRAINING PROGRAM

## 2023-02-02 RX ORDER — BUPROPION HYDROCHLORIDE 300 MG/1
300 TABLET ORAL DAILY
Status: DISCONTINUED | OUTPATIENT
Start: 2023-02-02 | End: 2023-02-06 | Stop reason: HOSPADM

## 2023-02-02 RX ADMIN — PANTOPRAZOLE SODIUM 40 MG: 40 TABLET, DELAYED RELEASE ORAL at 06:15

## 2023-02-02 RX ADMIN — TRAZODONE HYDROCHLORIDE 50 MG: 50 TABLET ORAL at 01:18

## 2023-02-02 RX ADMIN — LINACLOTIDE 145 MCG: 145 CAPSULE, GELATIN COATED ORAL at 06:15

## 2023-02-02 RX ADMIN — NICOTINE POLACRILEX 2 MG: 2 LOZENGE ORAL at 22:56

## 2023-02-02 RX ADMIN — NICOTINE POLACRILEX 2 MG: 2 LOZENGE ORAL at 12:46

## 2023-02-02 RX ADMIN — CYANOCOBALAMIN TAB 500 MCG 1000 MCG: 500 TAB at 06:15

## 2023-02-02 RX ADMIN — NICOTINE POLACRILEX 4 MG: 4 GUM, CHEWING BUCCAL at 14:24

## 2023-02-02 RX ADMIN — FOLIC ACID 1 MG: 1 TABLET ORAL at 06:15

## 2023-02-02 RX ADMIN — NICOTINE POLACRILEX 2 MG: 2 LOZENGE ORAL at 01:18

## 2023-02-02 RX ADMIN — NICOTINE POLACRILEX 2 MG: 2 LOZENGE ORAL at 16:06

## 2023-02-02 RX ADMIN — NICOTINE POLACRILEX 2 MG: 2 LOZENGE ORAL at 10:58

## 2023-02-02 RX ADMIN — NICOTINE POLACRILEX 2 MG: 2 LOZENGE ORAL at 17:51

## 2023-02-02 RX ADMIN — CETIRIZINE HYDROCHLORIDE 10 MG: 10 TABLET ORAL at 06:15

## 2023-02-02 RX ADMIN — BUPROPION HYDROCHLORIDE 300 MG: 300 TABLET, FILM COATED, EXTENDED RELEASE ORAL at 08:45

## 2023-02-02 RX ADMIN — Medication 1000 UNITS: at 06:15

## 2023-02-02 RX ADMIN — NICOTINE POLACRILEX 2 MG: 2 LOZENGE ORAL at 20:12

## 2023-02-02 RX ADMIN — ARIPIPRAZOLE 10 MG: 10 TABLET ORAL at 06:15

## 2023-02-02 NOTE — GROUP NOTE
Group Therapy Note    Date: 2/2/2023    Group Start Time: 1000  Group End Time: 1100  Group Topic: Psychoeducation    [facility]    SUSAN Taylor        Group Therapy Note  Clinician introduced the stages of change to the group. They defined the words motivation and intention and discussed as a group. The clinician used the 4 intention questions to help them find their motivation to get unstuck and move forward with the desire to make the changes. Attendees: 11       Patient's Goal:      Notes:  Patient arrived on time for group and stayed for the duration of group time. Patient was cooperative and fully engaged in the group. Patient participated in the group activity and discussion. Patient responses were appropriate to other group members. Status After Intervention:  Improved    Participation Level:  Active Listener and Interactive    Participation Quality: Appropriate, Attentive, Sharing, and Supportive      Speech:  normal      Thought Process/Content: Linear      Affective Functioning: Congruent      Mood: anxious and fearful      Level of consciousness:  Attentive      Response to Learning: Able to retain information      Endings: None Reported    Modes of Intervention: Education, Support, and Activity      Discipline Responsible: /Counselor      Signature:  SUSAN Tyalor

## 2023-02-02 NOTE — PROGRESS NOTES
Group Therapy Note    Date: 2/2/2023  Start Time: 1300  End Time:  1400  Number of Participants: 8    Type of Group: Music Group    Notes:  Pt present for Music Group. Pt actively participated by making song selections and singing along to music. Participation Level:  Active Listener and Interactive    Participation Quality: Appropriate and Attentive      Speech:  normal      Affective Functioning: Congruent and Incongruent      Endings: None Reported    Modes of Intervention: Support, Socialization, Activity, and Media      Discipline Responsible: Music Group      Swapnil Belle       02/02/23 1438   Encounter Summary   Encounter Overview/Reason  Behavioral Health   Service Provided For: Patient   Last Encounter    (2/2 Music Group)   Complexity of Encounter Moderate   Begin Time 1300   End Time  1400   Total Time Calculated 60 min   Behavioral Health    Type  Spirituality Group

## 2023-02-02 NOTE — PROGRESS NOTES
Department of Psychiatry  Progress Note    Patient's chart was reviewed. Discussed with treatment team. Met with patient. SUBJECTIVE:      Reports feeling better since admission and resuming treatment. Active in the milieu and programming. Tolerating Wellbutrin and increased dose of Abilify well. ROS:   Patient has new complaints: no  Sleeping adequately:  Yes   Appetite adequate: Yes  Engaged in programming: Yes    OBJECTIVE:  VITALS:  BP (!) 152/104   Pulse 79   Temp 98.3 °F (36.8 °C) (Oral)   Resp 16   Ht 5' 9\" (1.753 m)   Wt 155 lb (70.3 kg)   SpO2 100%   BMI 22.89 kg/m²     Mental Status Examination:    Appearance: fair grooming and hygiene  Behavior/Attitude toward examiner:  cooperative, attentive and fair eye contact  Speech: Normal rate, volume, amount  Mood:  \"better\"  Affect:  blunted     Thought processes:  Goal directed, linear, no NORMA or gross disorganization  Thought Content: less SI, no HI, no delusions voiced, no obsessions  Perceptions: no AVH  Attention: attention span and concentration were intact to interview   Abstraction: intact  Cognition:  Alert and oriented to person, place, time, and situation, recall intact  Insight: fair  Judgment: fair    Medication:  Scheduled:   buPROPion  300 mg Oral Daily    ARIPiprazole  10 mg Oral Daily    pantoprazole  40 mg Oral QAM AC    nicotine  1 patch TransDERmal Daily    cetirizine  10 mg Oral Daily    folic acid  1 mg Oral Daily    linaclotide  145 mcg Oral Daily    vitamin B-12  1,000 mcg Oral Daily    Vitamin D  1,000 Units Oral Daily        PRN:  acetaminophen, hydrOXYzine HCl, traZODone, nicotine polacrilex, nicotine polacrilex     FORMULATION:  This is a domiciled, never , employed 81-year-old  with a history of multiple psychiatric diagnoses, who presented to our  emergency department with worsening thoughts of suicide. He presents  with symptoms of severe depression, anxiety, and cluster C personality  traits.   This is in the setting of cannabis and alcohol use disorders. This is also in the setting of family conflict, vitamin deficiencies  (including B12, folate, and vitamin D), and a possible eating disorder. Given the severity of his symptoms and worsening thoughts of suicide, he  was admitted for further evaluation, stabilization and treatment. Principal Problem:    Depression  Active Problems:    Irritable bowel syndrome with constipation    Cannabis use disorder, severe, dependence (HCC)    Cluster C personality disorder (HCC)    Allergic rhinitis    Gastroesophageal reflux disease    Tobacco use    Vitamin D deficiency    Folate deficiency    B12 deficiency    Alcohol use disorder, moderate, dependence (HCC)    ZAY (generalized anxiety disorder)  Resolved Problems:    * No resolved hospital problems. *     PLAN:  1. Admitted to Psychiatry for further evaluation, stabilization and treatment. 2.   On admission, increase Abilify to 10 mg. Added Wellbutrin  mg daily. Ordered  q.15-minute checks for safety, programming, and p.r.n. medication for  anxiety, agitation, and insomnia. 2/1/2023 - increase Wellbutrin to 300mg tomorrow. B12 and folate WNL    3. Hospitalist consult for admission. #IBS  -continue linzess      #GERD  -continue PPI     #Allergic rhinitis   -continue zyrtec    4. Collateral information gathered from family. 5.  Estimated length of stay of 4-6 days for evaluation and  stabilization. He is voluntary. Total time with patient was 50 minutes and more than 50 % of that time was spent counseling the patient on their symptoms, treatment, and expected goals.      Jude Brink MD

## 2023-02-02 NOTE — PLAN OF CARE
Problem: Safety - Adult  Goal: Free from fall injury  Outcome: Progressing     Problem: Anxiety  Goal: Will report anxiety at manageable levels  Description: INTERVENTIONS:  1. Administer medication as ordered  2. Teach and rehearse alternative coping skills  3. Provide emotional support with 1:1 interaction with staff  Outcome: Progressing     Problem: Coping  Goal: Pt/Family able to verbalize concerns and demonstrate effective coping strategies  Description: INTERVENTIONS:  1. Assist patient/family to identify coping skills, available support systems and cultural and spiritual values  2. Provide emotional support, including active listening and acknowledgement of concerns of patient and caregivers  3. Reduce environmental stimuli, as able  4. Instruct patient/family in relaxation techniques, as appropriate  5.  Assess for spiritual pain/suffering and initiate Spiritual Care, Psychosocial Clinical Specialist consults as needed  Outcome: Progressing

## 2023-02-03 PROCEDURE — 99233 SBSQ HOSP IP/OBS HIGH 50: CPT | Performed by: PSYCHIATRY & NEUROLOGY

## 2023-02-03 PROCEDURE — 1240000000 HC EMOTIONAL WELLNESS R&B

## 2023-02-03 PROCEDURE — 6370000000 HC RX 637 (ALT 250 FOR IP): Performed by: PSYCHIATRY & NEUROLOGY

## 2023-02-03 PROCEDURE — 6370000000 HC RX 637 (ALT 250 FOR IP): Performed by: STUDENT IN AN ORGANIZED HEALTH CARE EDUCATION/TRAINING PROGRAM

## 2023-02-03 RX ADMIN — FOLIC ACID 1 MG: 1 TABLET ORAL at 06:00

## 2023-02-03 RX ADMIN — NICOTINE POLACRILEX 2 MG: 2 LOZENGE ORAL at 23:33

## 2023-02-03 RX ADMIN — CETIRIZINE HYDROCHLORIDE 10 MG: 10 TABLET ORAL at 06:00

## 2023-02-03 RX ADMIN — BUPROPION HYDROCHLORIDE 300 MG: 300 TABLET, FILM COATED, EXTENDED RELEASE ORAL at 08:36

## 2023-02-03 RX ADMIN — NICOTINE POLACRILEX 2 MG: 2 LOZENGE ORAL at 11:55

## 2023-02-03 RX ADMIN — ARIPIPRAZOLE 10 MG: 10 TABLET ORAL at 06:00

## 2023-02-03 RX ADMIN — NICOTINE POLACRILEX 2 MG: 2 LOZENGE ORAL at 08:36

## 2023-02-03 RX ADMIN — Medication 1000 UNITS: at 06:00

## 2023-02-03 RX ADMIN — NICOTINE POLACRILEX 2 MG: 2 LOZENGE ORAL at 15:18

## 2023-02-03 RX ADMIN — PANTOPRAZOLE SODIUM 40 MG: 40 TABLET, DELAYED RELEASE ORAL at 06:00

## 2023-02-03 RX ADMIN — NICOTINE POLACRILEX 2 MG: 2 LOZENGE ORAL at 19:38

## 2023-02-03 RX ADMIN — LINACLOTIDE 145 MCG: 145 CAPSULE, GELATIN COATED ORAL at 06:07

## 2023-02-03 RX ADMIN — CYANOCOBALAMIN TAB 500 MCG 1000 MCG: 500 TAB at 06:00

## 2023-02-03 RX ADMIN — NICOTINE POLACRILEX 2 MG: 2 LOZENGE ORAL at 21:25

## 2023-02-03 NOTE — PROGRESS NOTES
Pt up ad chayo. Sociable with peers, pleasant and cooperative. Pt denies all, no issues to note. Behaviors appropriate. Will continue to monitor for safety and comfort.

## 2023-02-03 NOTE — PROGRESS NOTES
Department of Psychiatry  Progress Note    Patient's chart was reviewed. Discussed with treatment team. Met with patient. SUBJECTIVE:      Doing well. Active in the milieu, with peers,  and programming. Tolerating increased dose of Wellbutrin fine. ROS:   Patient has new complaints: no  Sleeping adequately:  Yes   Appetite adequate: Yes  Engaged in programming: Yes    OBJECTIVE:  VITALS:  BP (!) 149/96   Pulse 83   Temp 98 °F (36.7 °C) (Oral)   Resp 16   Ht 5' 9\" (1.753 m)   Wt 155 lb (70.3 kg)   SpO2 100%   BMI 22.89 kg/m²     Mental Status Examination:    Appearance: fair grooming and hygiene  Behavior/Attitude toward examiner:  cooperative, attentive and fair eye contact  Speech: Normal rate, volume, amount  Mood:  \"better\"  Affect:  mood congruent   Thought processes:  Goal directed, linear, no NORMA or gross disorganization  Thought Content: no SI, no HI, no delusions voiced, no obsessions  Perceptions: no AVH  Attention: attention span and concentration were intact to interview   Abstraction: intact  Cognition:  Alert and oriented to person, place, time, and situation, recall intact  Insight: fair  Judgment: fair    Medication:  Scheduled:   buPROPion  300 mg Oral Daily    ARIPiprazole  10 mg Oral Daily    pantoprazole  40 mg Oral QAM AC    nicotine  1 patch TransDERmal Daily    cetirizine  10 mg Oral Daily    folic acid  1 mg Oral Daily    linaclotide  145 mcg Oral Daily    vitamin B-12  1,000 mcg Oral Daily    Vitamin D  1,000 Units Oral Daily        PRN:  acetaminophen, hydrOXYzine HCl, traZODone, nicotine polacrilex, nicotine polacrilex     FORMULATION:  This is a domiciled, never , employed 69-year-old  with a history of multiple psychiatric diagnoses, who presented to our  emergency department with worsening thoughts of suicide. He presents  with symptoms of severe depression, anxiety, and cluster C personality  traits.   This is in the setting of cannabis and alcohol use disorders. This is also in the setting of family conflict, vitamin deficiencies  (including B12, folate, and vitamin D), and a possible eating disorder. Given the severity of his symptoms and worsening thoughts of suicide, he  was admitted for further evaluation, stabilization and treatment. Principal Problem:    Depression  Active Problems:    Irritable bowel syndrome with constipation    Cannabis use disorder, severe, dependence (HCC)    Cluster C personality disorder (HCC)    Allergic rhinitis    Gastroesophageal reflux disease    Tobacco use    Vitamin D deficiency    Folate deficiency    B12 deficiency    Alcohol use disorder, moderate, dependence (HCC)    ZAY (generalized anxiety disorder)  Resolved Problems:    * No resolved hospital problems. *     PLAN:  1. Admitted to Psychiatry for further evaluation, stabilization and treatment. 2.   On admission, increase Abilify to 10 mg. Added Wellbutrin  mg daily. Ordered  q.15-minute checks for safety, programming, and p.r.n. medication for  anxiety, agitation, and insomnia. 2/1/2023 - increased Wellbutrin to 300mg tomorrow. B12 and folate WNL    3. Hospitalist consult for admission. #IBS  -continue linzess      #GERD  -continue PPI     #Allergic rhinitis   -continue zyrtec    4. Collateral information gathered from family. 5.  Estimated length of stay of 4-6 days for evaluation and  stabilization. He is voluntary. Total time with patient was 50 minutes and more than 50 % of that time was spent counseling the patient on their symptoms, treatment, and expected goals.      Aysha Lopez MD

## 2023-02-03 NOTE — GROUP NOTE
Group Therapy Note    Date: 2/2/2023    Group Start Time: 2030  Group End Time: 2050  Group Topic: 27 Darlene Gilliland RN        Group Therapy Note    Attendees: Group Documentation    Patient attended  group therapy that reviewed the importance of setting daily goals. Nurse specifically asked attendees if they would like to share the goal they set for the day and if they had met that goal.  Nurse shared how it may help to set one or two easy goals per day and continue to add goals to each day as one feels better mentally. Patients were also given education on the importance of describing their specific symptoms to staff in order to help us correctly assess their mental health. Patients were encourage to let the staff know if they felt they were becoming suicidal or hearing voices. Also, the patients were educated that nursing would like to know what quality of sleep they are getting and any side effects they are having to their medications. Patients were encouraged to contemplate what led to their admission and to think about how much they are improving or are not improving with their symptoms and the importance of sharing their concerns with their nurse or HCP. Patients were very engaged in the discussion and attentive and voiced their appreciation of the topic.          Patient's Goal:  to be more social    Notes: shared his feelings    Status After Intervention:  Improved    Participation Level: Interactive    Participation Quality: Sharing      Speech:  normal      Thought Process/Content: Logical      Affective Functioning: Congruent      Mood: euthymic      Level of consciousness:  Oriented x4      Response to Learning: Progressing to goal      Endings: None Reported    Modes of Intervention: Education      Discipline Responsible: Registered Nurse      Signature:  Hannah Francisco RN

## 2023-02-03 NOTE — GROUP NOTE
Group Therapy Note    Date: 2/3/2023    Group Start Time: 1300  Group End Time: 3024  Group Topic: Psychoeducation    [facility]    SUSAN Adan        Group Therapy Note  Clinician introduced the group members to the fight/flight/fear response. Member shared their somatic symptoms and the clinician taught them grounding and mindfulness techniques to return to baseline. Attendees: 5       Patient's Goal:      Notes:  Patient was cooperative and fully engaged in the group discussion and skills practice. He participated in the mindfulness and grounding techniques. Patient discussed feeling calm and relaxed afterwards. Status After Intervention:  Improved    Participation Level:  Active Listener and Interactive    Participation Quality: Appropriate, Attentive, Sharing, and Supportive      Speech:  normal      Thought Process/Content: Logical      Affective Functioning: Congruent      Mood: euthymic      Level of consciousness:  Attentive      Response to Learning: Able to verbalize/acknowledge new learning      Endings: None Reported    Modes of Intervention: Education, Support, Exploration, and Activity      Discipline Responsible: /Counselor      Signature:  SUSAN Adan

## 2023-02-03 NOTE — GROUP NOTE
Group Therapy Note    Date: 2/3/2023    Group Start Time: 1000  Group End Time: 4069  Group Topic: Psychoeducation    [facility]    Jean Meigs, MSW        Group Therapy Note  Clinician introduced barriers in communication, typed of communication, taking accountability in what and how we communicate and accepting feedback. Attendees: 9       Patient's Goal:      Notes:  Patient was cooperative and fully engaged in the group discussion and activity. Patient discussed the non verbal, verbal, body language, tone of voice etc., and the barriers such as assumptions. Patient was able to discuss the importance of accepting feedback. Status After Intervention:  Improved    Participation Level:  Active Listener and Interactive    Participation Quality: Appropriate, Attentive, Sharing, and Supportive      Speech:  normal      Thought Process/Content: Logical      Affective Functioning: Congruent      Mood: anxious and fearful      Level of consciousness:  Attentive      Response to Learning: Able to retain information      Endings: None Reported    Modes of Intervention: Education, Support, and Activity      Discipline Responsible: /Counselor      Signature:  Jean Meigs, MSW

## 2023-02-03 NOTE — PLAN OF CARE
Problem: Safety - Adult  Goal: Free from fall injury  Outcome: Progressing     Problem: Anxiety  Goal: Will report anxiety at manageable levels  Description: INTERVENTIONS:  1. Administer medication as ordered  2. Teach and rehearse alternative coping skills  3. Provide emotional support with 1:1 interaction with staff  Outcome: Progressing     Problem: Coping  Goal: Pt/Family able to verbalize concerns and demonstrate effective coping strategies  Description: INTERVENTIONS:  1. Assist patient/family to identify coping skills, available support systems and cultural and spiritual values  2. Provide emotional support, including active listening and acknowledgement of concerns of patient and caregivers  3. Reduce environmental stimuli, as able  4. Instruct patient/family in relaxation techniques, as appropriate  5. Assess for spiritual pain/suffering and initiate Spiritual Care, Psychosocial Clinical Specialist consults as needed  Outcome: Progressing     Problem: Depression/Self Harm  Goal: Effect of psychiatric condition will be minimized and patient will be protected from self harm  Description: INTERVENTIONS:  1. Assess impact of patient's symptoms on level of functioning, self care needs and offer support as indicated  2. Assess patient/family knowledge of depression, impact on illness and need for teaching  3. Provide emotional support, presence and reassurance  4. Assess for possible suicidal thoughts or ideation. If patient expresses suicidal thoughts or statements do not leave alone, initiate Suicide Precautions, move to a room close to the nursing station and obtain sitter  5.  Initiate consults as appropriate with Mental Health Professional, Spiritual Care, Psychosocial CNS, and consider a recommendation to the [facility] for a Psychiatric Consultation  Outcome: Progressing

## 2023-02-03 NOTE — GROUP NOTE
Group Therapy Note    Date: 2/3/2023    Group Start Time: 1100  Group End Time: 6476  Group Topic: Psychoeducation    [address] [place] [address]        Group Therapy Note    Attendees: 9    Facilitator led group discussion on emotional regulation. Patients shared thoughts on what emotional regulation was and facilitator gave information on how to manage emotions in a healthy way. Patients discussed methods of emotional regulation and were give emotion wheels to draw how they currently respond to emotions v how they would like to respond. Patients were then encouraged to consider action steps to help change their emotional responses. Patient's Goal:  Patient will be contribute to group discussion and be able to understand the basic concept of emotional regulation and methods to manage emotional responses. Notes:  Patient accepted information handouts from facilitator and engaged in group discussion    Status After Intervention:  Improved    Participation Level:  Active Listener and Interactive    Participation Quality: Appropriate      Speech:  normal      Thought Process/Content: Logical  Linear      Affective Functioning: Congruent      Mood: euthymic      Level of consciousness:  Alert, Oriented x4, and Attentive      Response to Learning: Able to verbalize current knowledge/experience, Able to verbalize/acknowledge new learning, and Progressing to goal      Endings: None Reported    Modes of Intervention: Education and Problem-solving      Discipline Responsible: /Counselor      Signature:  JODI Ward

## 2023-02-04 PROCEDURE — 6370000000 HC RX 637 (ALT 250 FOR IP): Performed by: PSYCHIATRY & NEUROLOGY

## 2023-02-04 PROCEDURE — 1240000000 HC EMOTIONAL WELLNESS R&B

## 2023-02-04 PROCEDURE — 6370000000 HC RX 637 (ALT 250 FOR IP): Performed by: STUDENT IN AN ORGANIZED HEALTH CARE EDUCATION/TRAINING PROGRAM

## 2023-02-04 RX ORDER — DIMETHICONE, OXYBENZONE, AND PADIMATE O 2; 2.5; 6.6 G/100G; G/100G; G/100G
STICK TOPICAL PRN
Status: DISCONTINUED | OUTPATIENT
Start: 2023-02-04 | End: 2023-02-06 | Stop reason: HOSPADM

## 2023-02-04 RX ADMIN — BUPROPION HYDROCHLORIDE 300 MG: 300 TABLET, FILM COATED, EXTENDED RELEASE ORAL at 08:27

## 2023-02-04 RX ADMIN — ARIPIPRAZOLE 10 MG: 10 TABLET ORAL at 06:08

## 2023-02-04 RX ADMIN — PANTOPRAZOLE SODIUM 40 MG: 40 TABLET, DELAYED RELEASE ORAL at 06:08

## 2023-02-04 RX ADMIN — NICOTINE POLACRILEX 2 MG: 2 LOZENGE ORAL at 20:37

## 2023-02-04 RX ADMIN — CETIRIZINE HYDROCHLORIDE 10 MG: 10 TABLET ORAL at 06:08

## 2023-02-04 RX ADMIN — Medication 1000 UNITS: at 06:08

## 2023-02-04 RX ADMIN — FOLIC ACID 1 MG: 1 TABLET ORAL at 06:08

## 2023-02-04 RX ADMIN — NICOTINE POLACRILEX 2 MG: 2 LOZENGE ORAL at 18:13

## 2023-02-04 RX ADMIN — NICOTINE POLACRILEX 2 MG: 2 LOZENGE ORAL at 12:30

## 2023-02-04 RX ADMIN — NICOTINE POLACRILEX 2 MG: 2 LOZENGE ORAL at 09:55

## 2023-02-04 RX ADMIN — CYANOCOBALAMIN TAB 500 MCG 1000 MCG: 500 TAB at 06:08

## 2023-02-04 RX ADMIN — NICOTINE POLACRILEX 2 MG: 2 LOZENGE ORAL at 16:05

## 2023-02-04 RX ADMIN — NICOTINE POLACRILEX 2 MG: 2 LOZENGE ORAL at 22:25

## 2023-02-04 RX ADMIN — LINACLOTIDE 145 MCG: 145 CAPSULE, GELATIN COATED ORAL at 06:08

## 2023-02-04 RX ADMIN — Medication 4.25 G: at 20:35

## 2023-02-04 NOTE — PLAN OF CARE
Jacque Sahu is alert and oriented X4. He is pleasant, but appears flat and sad most of the time. He brightens when when socializing with his peers. He states his anxiety and depression have lessened. He denies any hallucinations and has not been noted to be RTIS. He denies SI/HI.

## 2023-02-04 NOTE — PLAN OF CARE
Problem: Safety - Adult  Goal: Free from fall injury  2/3/2023 2142 by Giorgi Bonilla), RN  Outcome: Progressing  2/3/2023 1318 by Giorgi Bonilla), RN  Outcome: Progressing     Problem: Anxiety  Goal: Will report anxiety at manageable levels  Description: INTERVENTIONS:  1. Administer medication as ordered  2. Teach and rehearse alternative coping skills  3. Provide emotional support with 1:1 interaction with staff  2/3/2023 2142 by Giorgi Bonilla), RN  Outcome: Progressing  2/3/2023 1744 by Giorgi Bonilla), RN  Outcome: Progressing     Problem: Coping  Goal: Pt/Family able to verbalize concerns and demonstrate effective coping strategies  Description: INTERVENTIONS:  1. Assist patient/family to identify coping skills, available support systems and cultural and spiritual values  2. Provide emotional support, including active listening and acknowledgement of concerns of patient and caregivers  3. Reduce environmental stimuli, as able  4. Instruct patient/family in relaxation techniques, as appropriate  5. Assess for spiritual pain/suffering and initiate Spiritual Care, Psychosocial Clinical Specialist consults as needed  2/3/2023 2142 by iGorgi Bullard, RN  Outcome: Progressing  2/3/2023 0932 by Giorgi Bonilla), RN  Outcome: Progressing     Problem: Depression/Self Harm  Goal: Effect of psychiatric condition will be minimized and patient will be protected from self harm  Description: INTERVENTIONS:  1. Assess impact of patient's symptoms on level of functioning, self care needs and offer support as indicated  2. Assess patient/family knowledge of depression, impact on illness and need for teaching  3. Provide emotional support, presence and reassurance  4. Assess for possible suicidal thoughts or ideation.  If patient expresses suicidal thoughts or statements do not leave alone, initiate Suicide Precautions, move to a room close to the nursing station and obtain sitter  5.  Initiate consults as appropriate with Mental Health Professional, Spiritual Care, Psychosocial CNS, and consider a recommendation to the [facility] for a Psychiatric Consultation  Outcome: Progressing

## 2023-02-04 NOTE — PROGRESS NOTES
Department of Psychiatry  Progress Note    Patient's chart was reviewed. Discussed with treatment team. Met with patient. SUBJECTIVE:      Making gains -  active in the milieu, with peers,  and programming. Reports less anxiety and depression. Is motivated to do better. Tolerating increased doses of Wellbutrin and Abilify fine. ROS:   Patient has new complaints: no  Sleeping adequately:  Yes   Appetite adequate: Yes  Engaged in programming: Yes    OBJECTIVE:  VITALS:  BP (!) 164/93   Pulse 94   Temp 98.3 °F (36.8 °C) (Oral)   Resp 16   Ht 5' 9\" (1.753 m)   Wt 155 lb (70.3 kg)   SpO2 99%   BMI 22.89 kg/m²     Mental Status Examination:    Appearance: fair grooming and hygiene  Behavior/Attitude toward examiner:  cooperative, attentive and fair eye contact  Speech: Normal rate, volume, amount  Mood:  \"better\"  Affect:  mood congruent   Thought processes:  Goal directed, linear, no NORMA or gross disorganization  Thought Content: no SI, no HI, no delusions voiced, no obsessions  Perceptions: no AVH  Attention: attention span and concentration were intact to interview   Abstraction: intact  Cognition:  Alert and oriented to person, place, time, and situation, recall intact  Insight: fair  Judgment: fair    Medication:  Scheduled:   buPROPion  300 mg Oral Daily    ARIPiprazole  10 mg Oral Daily    pantoprazole  40 mg Oral QAM AC    nicotine  1 patch TransDERmal Daily    cetirizine  10 mg Oral Daily    folic acid  1 mg Oral Daily    linaclotide  145 mcg Oral Daily    vitamin B-12  1,000 mcg Oral Daily    Vitamin D  1,000 Units Oral Daily        PRN:  acetaminophen, hydrOXYzine HCl, traZODone, nicotine polacrilex, nicotine polacrilex     FORMULATION:  This is a domiciled, never , employed 60-year-old  with a history of multiple psychiatric diagnoses, who presented to our  emergency department with worsening thoughts of suicide.   He presents  with symptoms of severe depression, anxiety, and cluster C personality  traits. This is in the setting of cannabis and alcohol use disorders. This is also in the setting of family conflict, vitamin deficiencies  (including B12, folate, and vitamin D), and a possible eating disorder. Given the severity of his symptoms and worsening thoughts of suicide, he  was admitted for further evaluation, stabilization and treatment. Principal Problem:    Depression  Active Problems:    Irritable bowel syndrome with constipation    Cannabis use disorder, severe, dependence (HCC)    Cluster C personality disorder (HCC)    Allergic rhinitis    Gastroesophageal reflux disease    Tobacco use    Vitamin D deficiency    Folate deficiency    B12 deficiency    Alcohol use disorder, moderate, dependence (HCC)    ZAY (generalized anxiety disorder)  Resolved Problems:    * No resolved hospital problems. *     PLAN:  1. Admitted to Psychiatry for further evaluation, stabilization and treatment. 2.   On admission, increase Abilify to 10 mg. Added Wellbutrin  mg daily. Ordered  q.15-minute checks for safety, programming, and p.r.n. medication for  anxiety, agitation, and insomnia. 2/1/2023 - increased Wellbutrin to 300mg tomorrow. B12 and folate WNL    3. Hospitalist consult for admission. #IBS  -continue linzess      #GERD  -continue PPI     #Allergic rhinitis   -continue zyrtec    4. Collateral information gathered from family. 5.  Estimated length of stay of 4-6 days for evaluation and  stabilization. He is voluntary. [employer] [facility] Monday. Total time with patient was 50 minutes and more than 50 % of that time was spent counseling the patient on their symptoms, treatment, and expected goals.      Durga Tai MD

## 2023-02-04 NOTE — PROGRESS NOTES
Department of Psychiatry  AttendingProgress Note  Chief Complaint: depression  Mirtha Greenwood is active in program. He is energetic, insightful and discussed his current situation at home. He believes that he will need to move out of the negative environment in  his home. Feeling less anxious overall. Patient's chart was reviewed and collaborated with  about the treatment plan. SUBJECTIVE:    Patient is feeling better. Suicidal ideation:  denies suicidal ideation. Patient does not have medication side effects. ROS: Patient has new complaints: no  Sleeping adequately:  Yes   Appetite adequate: Yes  Attending groups: Yes  Visitors:No    OBJECTIVE    Physical  VITALS:  BP (!) 142/97   Pulse 83   Temp 98.8 °F (37.1 °C) (Oral)   Resp 14   Ht 5' 9\" (1.753 m)   Wt 155 lb (70.3 kg)   SpO2 100%   BMI 22.89 kg/m²     Mental Status Examination:  Patients appearance was street clothes. Thoughts are Goal directed. Homicidal ideations none. No abnormal movements, tics or mannerisms. Memory intact Aims 0. Concentration Good. Alert and oriented X 4. Insight and Judgement good  insight. Patient was cooperative.  Patient gait normal. Mood within normal limits, affect normal affect Hallucinations Absent, suicidal ideations no specific plan to harm self Speech normal volume  Data  Labs:   Admission on 01/29/2023   Component Date Value Ref Range Status    WBC 01/30/2023 8.0  4.0 - 11.0 K/uL Final    RBC 01/30/2023 4.37  4.20 - 5.90 M/uL Final    Hemoglobin 01/30/2023 14.3  13.5 - 17.5 g/dL Final    Hematocrit 01/30/2023 40.1 (A)  40.5 - 52.5 % Final    MCV 01/30/2023 91.8  80.0 - 100.0 fL Final    MCH 01/30/2023 32.8  26.0 - 34.0 pg Final    MCHC 01/30/2023 35.7  31.0 - 36.0 g/dL Final    RDW 01/30/2023 12.7  12.4 - 15.4 % Final    Platelets 63/57/8803 241  135 - 450 K/uL Final    MPV 01/30/2023 7.4  5.0 - 10.5 fL Final    PLATELET SLIDE REVIEW 01/30/2023 Adequate   Final    SLIDE REVIEW 01/30/2023 see below Final    Slide review agrees with reported results    Neutrophils % 01/30/2023 46.8  % Final    Lymphocytes % 01/30/2023 45.2  % Final    Monocytes % 01/30/2023 5.8  % Final    Eosinophils % 01/30/2023 1.1  % Final    Basophils % 01/30/2023 1.1  % Final    Neutrophils Absolute 01/30/2023 3.7  1.7 - 7.7 K/uL Final    Lymphocytes Absolute 01/30/2023 3.6  1.0 - 5.1 K/uL Final    Monocytes Absolute 01/30/2023 0.5  0.0 - 1.3 K/uL Final    Eosinophils Absolute 01/30/2023 0.1  0.0 - 0.6 K/uL Final    Basophils Absolute 01/30/2023 0.1  0.0 - 0.2 K/uL Final    Sodium 01/30/2023 135 (A)  136 - 145 mmol/L Final    Potassium reflex Magnesium 01/30/2023 3.6  3.5 - 5.1 mmol/L Final    Chloride 01/30/2023 95 (A)  99 - 110 mmol/L Final    CO2 01/30/2023 26  21 - 32 mmol/L Final    Anion Gap 01/30/2023 14  3 - 16 Final    Glucose 01/30/2023 85  70 - 99 mg/dL Final    BUN 01/30/2023 8  7 - 20 mg/dL Final    Creatinine 01/30/2023 0.8 (A)  0.9 - 1.3 mg/dL Final    Est, Glom Filt Rate 01/30/2023 >60  >60 Final    Comment: Pediatric calculator link  [email or web address] org/professionals/kdoqi/gfr_calculatorped  Effective Oct 3, 2022  These results are not intended for use in patients  <25years of age. eGFR results are calculated without  a race factor using the 2021 CKD-EPI equation. Careful  clinical correlation is recommended, particularly when  comparing to results calculated using previous equations. The CKD-EPI equation is less accurate in patients with  extremes of muscle mass, extra-renal metabolism of  creatinine, excessive creatinine ingestion, or following  therapy that affects renal tubular secretion.       Calcium 01/30/2023 8.3  8.3 - 10.6 mg/dL Final    Total Protein 01/30/2023 7.3  6.4 - 8.2 g/dL Final    Albumin 01/30/2023 4.6  3.4 - 5.0 g/dL Final    Albumin/Globulin Ratio 01/30/2023 1.7  1.1 - 2.2 Final    Total Bilirubin 01/30/2023 0.3  0.0 - 1.0 mg/dL Final    Alkaline Phosphatase 01/30/2023 89  40 - 129 U/L Final ALT 01/30/2023 20  10 - 40 U/L Final    AST 01/30/2023 32  15 - 37 U/L Final    Ethanol Lvl 01/30/2023 208  mg/dL Final    Comment:    None Detected  Conversion factor:  100 mg/dl = .100 g/dl  For Medical Purposes Only      Amphetamine Screen, Urine 01/29/2023 Neg  Negative <1000ng/mL Final    Barbiturate Screen, Ur 01/29/2023 Neg  Negative <200 ng/mL Final    Benzodiazepine Screen, Urine 01/29/2023 Neg  Negative <200 ng/mL Final    Cannabinoid Scrn, Ur 01/29/2023 POSITIVE (A)  Negative <50 ng/mL Final    Cocaine Metabolite Screen, Urine 01/29/2023 Neg  Negative <300 ng/mL Final    Opiate Scrn, Ur 01/29/2023 Neg  Negative <300 ng/mL Final    Comment: \"Therapeutic levels of pain medication, especially oxycontin and synthetic  opioids, may not be detected by this Methodology. Pain management screen  panel  Drug panel-PM-Hi Res Ur, Interp (PAIN) should be considered for drug  monitoring \". PCP Screen, Urine 01/29/2023 Neg  Negative <25 ng/mL Final    Methadone Screen, Urine 01/29/2023 Neg  Negative <300 ng/mL Final    Oxycodone Urine 01/29/2023 Neg  Negative <100 ng/ml Final    FENTANYL SCREEN, URINE 01/29/2023 Neg  Negative <5 ng/mL Final    pH, UA 01/29/2023 6.0   Final    Comment: Urine pH less than 5.0 or greater than 8.0 may indicate sample adulteration. Another sample should be collected if clinically  indicated. Drug Screen Comment: 01/29/2023 see below   Final    Comment: This method is a screening test to detect only these drug  classes as part of a medical workup. Confirmatory testing  by another method should be ordered if clinically indicated.       Acetaminophen Level 01/30/2023 <5 (A)  10 - 30 ug/mL Final    Comment: Therapeutic Range: 10.0-30.0 ug/mL  Toxic: >=415 ug/mL      Salicylate, Serum 89/26/0445 <0.3 (A)  15.0 - 30.0 mg/dL Final    Comment: Therapeutic Range: 15.0-30.0 mg/dL  Toxic: >30.0 mg/dL      SARS-CoV-2 RNA, RT PCR 01/30/2023 NOT DETECTED  NOT DETECTED Final    Comment: Not Detected results do not preclude SARS-CoV-2 infection and  should not be used as the sole basis for patient management  decisions. Results must be combined with clinical observations,  patient history, and epidemiological information. Testing was performed using BRIANNA BONILLA SARS-CoV-2 and Influenza A/B  nucleic acid assay. This test is a multiplex Real-Time Reverse  Transcriptase Polymerase Chain Reaction (RT-PCR)-based in vitro  diagnostic test intended for the qualitative detection of nucleic  acids from SARS-CoV-2, influenza A, and influenza B in nasopharyngeal  and nasal swab specimens for use under the FDAs Emergency Use  Authorization (EUA) only.     Patient Fact Sheet:  FindDrives.pl  Provider Fact Sheet: FindDrives.pl  EUA: FindDrives.pl  IFU: FindDrives.pl    Methodology:  RT-PCR      INFLUENZA A 01/30/2023 NOT DETECTED  NOT DETECTED Final    INFLUENZA B 01/30/2023 NOT DETECTED  NOT DETECTED Final    Color, UA 01/29/2023 Yellow  Straw/Yellow Final    Clarity, UA 01/29/2023 Clear  Clear Final    Glucose, Ur 01/29/2023 Negative  Negative mg/dL Final    Bilirubin Urine 01/29/2023 Negative  Negative Final    Ketones, Urine 01/29/2023 Negative  Negative mg/dL Final    Specific Gravity, UA 01/29/2023 1.010  1.005 - 1.030 Final    Blood, Urine 01/29/2023 Negative  Negative Final    pH, UA 01/29/2023 6.0  5.0 - 8.0 Final    Protein, UA 01/29/2023 Negative  Negative mg/dL Final    Urobilinogen, Urine 01/29/2023 0.2  <2.0 E.U./dL Final    Nitrite, Urine 01/29/2023 Negative  Negative Final    Leukocyte Esterase, Urine 01/29/2023 Negative  Negative Final    Microscopic Examination 01/29/2023 Not Indicated   Final    Urine Type 01/29/2023 NotGiven   Final    Urine Reflex to Culture 01/29/2023 Not Indicated   Final    Ethanol Lvl 01/30/2023 35  mg/dL Final    Comment:    None Detected  Conversion factor:  100 mg/dl = .100 g/dl  For Medical Purposes Only      Vitamin B-12 01/30/2023 458  211 - 911 pg/mL Final    Folate 01/30/2023 >20.00  4.78 - 24.20 ng/mL Final    Comment: Effective 11-15-16 10:00am EST  Please note reference ranges have  changed for Folate. Medications  Current Facility-Administered Medications: medicated lip balm (BLISTEX/CARMEX) stick, , Topical, PRN  buPROPion (WELLBUTRIN XL) extended release tablet 300 mg, 300 mg, Oral, Daily  ARIPiprazole (ABILIFY) tablet 10 mg, 10 mg, Oral, Daily  pantoprazole (PROTONIX) tablet 40 mg, 40 mg, Oral, QAM AC  acetaminophen (TYLENOL) tablet 650 mg, 650 mg, Oral, Q6H PRN  hydrOXYzine HCl (ATARAX) tablet 50 mg, 50 mg, Oral, TID PRN  traZODone (DESYREL) tablet 50 mg, 50 mg, Oral, Nightly PRN  nicotine polacrilex (COMMIT) lozenge 2 mg, 2 mg, Oral, Q1H PRN  nicotine (NICODERM CQ) 21 MG/24HR 1 patch, 1 patch, TransDERmal, Daily  nicotine polacrilex (NICORETTE) gum 4 mg, 4 mg, Oral, Q1H PRN  cetirizine (ZYRTEC) tablet 10 mg, 10 mg, Oral, Daily  folic acid (FOLVITE) tablet 1 mg, 1 mg, Oral, Daily  linaclotide (LINZESS) capsule 145 mcg, 145 mcg, Oral, Daily  vitamin B-12 (CYANOCOBALAMIN) tablet 1,000 mcg, 1,000 mcg, Oral, Daily  vitamin D (CHOLECALCIFEROL) tablet 1,000 Units, 1,000 Units, Oral, Daily    ASSESSMENT AND PLAN    Principal Problem:    Depression  Active Problems:    Irritable bowel syndrome with constipation    Cannabis use disorder, severe, dependence (HCC)    Cluster C personality disorder (HCC)    Allergic rhinitis    Gastroesophageal reflux disease    Tobacco use    Vitamin D deficiency    Folate deficiency    B12 deficiency    Alcohol use disorder, moderate, dependence (HCC)    ZAY (generalized anxiety disorder)  Resolved Problems:    * No resolved hospital problems. *       1. Patient s symptoms   are improving  2. Probable discharge is next week  3. Discharge planning is incomplete  4. Suicidal ideation is  none  5.  Total time with patient was 50 minutes and more than 50 % of that time was spent counseling the patient on their symptoms, treatment and expected goals.         Antointete Davies MD  Physician Psychiatry

## 2023-02-04 NOTE — PROGRESS NOTES
Pt up ad chayo. Attended groups and participated. Sociable with peers. Pt stated he had a rough conversation with his mom the night before, but was very guarded when discussing his emotions. Pt appeared more flat today. Pt has been open with peers for support. Eating well. Will continue to monitor for safety and comfort.

## 2023-02-05 PROCEDURE — 1240000000 HC EMOTIONAL WELLNESS R&B

## 2023-02-05 PROCEDURE — 6370000000 HC RX 637 (ALT 250 FOR IP): Performed by: PSYCHIATRY & NEUROLOGY

## 2023-02-05 PROCEDURE — 6370000000 HC RX 637 (ALT 250 FOR IP): Performed by: STUDENT IN AN ORGANIZED HEALTH CARE EDUCATION/TRAINING PROGRAM

## 2023-02-05 RX ORDER — QUETIAPINE FUMARATE 25 MG/1
50 TABLET, FILM COATED ORAL NIGHTLY
Status: DISCONTINUED | OUTPATIENT
Start: 2023-02-05 | End: 2023-02-06 | Stop reason: HOSPADM

## 2023-02-05 RX ADMIN — BUPROPION HYDROCHLORIDE 300 MG: 300 TABLET, FILM COATED, EXTENDED RELEASE ORAL at 06:22

## 2023-02-05 RX ADMIN — NICOTINE POLACRILEX 2 MG: 2 LOZENGE ORAL at 13:51

## 2023-02-05 RX ADMIN — Medication 1000 UNITS: at 06:21

## 2023-02-05 RX ADMIN — NICOTINE POLACRILEX 2 MG: 2 LOZENGE ORAL at 03:12

## 2023-02-05 RX ADMIN — FOLIC ACID 1 MG: 1 TABLET ORAL at 06:22

## 2023-02-05 RX ADMIN — NICOTINE POLACRILEX 2 MG: 2 LOZENGE ORAL at 10:16

## 2023-02-05 RX ADMIN — NICOTINE POLACRILEX 2 MG: 2 LOZENGE ORAL at 19:49

## 2023-02-05 RX ADMIN — ARIPIPRAZOLE 10 MG: 10 TABLET ORAL at 06:22

## 2023-02-05 RX ADMIN — LINACLOTIDE 145 MCG: 145 CAPSULE, GELATIN COATED ORAL at 06:21

## 2023-02-05 RX ADMIN — CETIRIZINE HYDROCHLORIDE 10 MG: 10 TABLET ORAL at 06:22

## 2023-02-05 RX ADMIN — CYANOCOBALAMIN TAB 500 MCG 1000 MCG: 500 TAB at 06:22

## 2023-02-05 RX ADMIN — NICOTINE POLACRILEX 2 MG: 2 LOZENGE ORAL at 21:50

## 2023-02-05 RX ADMIN — NICOTINE POLACRILEX 2 MG: 2 LOZENGE ORAL at 11:55

## 2023-02-05 RX ADMIN — PANTOPRAZOLE SODIUM 40 MG: 40 TABLET, DELAYED RELEASE ORAL at 06:22

## 2023-02-05 RX ADMIN — QUETIAPINE FUMARATE 50 MG: 25 TABLET ORAL at 22:03

## 2023-02-05 RX ADMIN — NICOTINE POLACRILEX 2 MG: 2 LOZENGE ORAL at 08:06

## 2023-02-05 ASSESSMENT — PAIN SCALES - GENERAL: PAINLEVEL_OUTOF10: 0

## 2023-02-05 NOTE — PLAN OF CARE
Patient alert and oriented x 3. Patient rates Depression 5/10 and Anxiety 7/10. Patient visible and social with peers on the milieu. Patient denies SI/HI/A/V/H. Patient doesn't have HS medications scheduled. Patient medicated with nicotine lozenge was effective for nicotine cravings. Patient denies self harm. No c/o's voiced @ present.

## 2023-02-05 NOTE — BH NOTE
Patient requesting nicotine lozenge for nicotine cravings. Patient medicated with nicotine lozenge 2 mg po.

## 2023-02-05 NOTE — GROUP NOTE
Group Therapy Note    Date: 2/4/2023    Group Start Time: 2030  Group End Time: 2100  Group Topic: 27 Darlene Gilliland RN        Group Therapy Note    Attendees: Group Documentation    The topic for the group was \"Perseverance in Hard Times\". Participants were challenged to share what their motivation is that helps them to persevere when they want to give up. Different clients discussed that their children, their significant other, and music as sources of hope and strength to persevere. Patients were taught about how sometimes the adversity they face does not match the coping skills that they currently have available to them. Patients shared themes of abandonment that have left them feeling hopeless and helpless. Nurse equated their adversity to a 500 pound weight that they are not strong enough to lift. Additionally, they must learn to lift a weight by increasing strength as if weightlifting. Clients were challenged to accept that learning new coping skills hurts just as when one starts to work out and muscles have to adjust to the new physical demand. Client's were challenged to consider how they can be the best version of themselves in the midst of their condition. By the same token, the clients were educated not to compare themselves to the strengths of others because not everyone has the same resources available. Lastly, clients were praised for getting help for themselves and told that they are brave to admit reality instead of acting like they are fine. The discussion was lively and interactive with much participation. P  Status After Intervention:  Improved    Participation Level:  Active Listener    Participation Quality: Attentive      Speech:  normal      Thought Process/Content: Logical      Affective Functioning: Congruent      Mood: euthymic      Level of consciousness: Oriented x4      Response to Learning: Progressing to goal      Endings: None Reported    Modes of Intervention: Education      Discipline Responsible: Registered Nurse      Signature:  Anabelle Curiel RN

## 2023-02-05 NOTE — PROGRESS NOTES
Pt up ad chayo, sociable with peers. Pleasant and cooperative. Reports anxiety related to going home soon. He feels that he has made a lot of progress and is afraid that his family will not recognize how far he has come. Pt given much reassurance and emotional support. He reported concerns related to his blood pressures being consistently elevated. Medical aware and recommends he follow up with his primary doctor upon discharge. Pt agreed. Pt currently playing cards with peers. Will continue to monitor for safety and comfort.

## 2023-02-06 ENCOUNTER — TELEPHONE (OUTPATIENT)
Dept: FAMILY MEDICINE CLINIC | Age: 23
End: 2023-02-06

## 2023-02-06 VITALS
OXYGEN SATURATION: 99 % | WEIGHT: 155 LBS | HEIGHT: 69 IN | SYSTOLIC BLOOD PRESSURE: 142 MMHG | RESPIRATION RATE: 16 BRPM | TEMPERATURE: 98.3 F | HEART RATE: 71 BPM | BODY MASS INDEX: 22.96 KG/M2 | DIASTOLIC BLOOD PRESSURE: 79 MMHG

## 2023-02-06 PROBLEM — E53.8 FOLATE DEFICIENCY: Status: RESOLVED | Noted: 2023-01-31 | Resolved: 2023-02-06

## 2023-02-06 PROBLEM — R45.851 SUICIDAL IDEATION: Status: RESOLVED | Noted: 2023-01-31 | Resolved: 2023-02-06

## 2023-02-06 PROBLEM — E53.8 B12 DEFICIENCY: Status: RESOLVED | Noted: 2023-01-31 | Resolved: 2023-02-06

## 2023-02-06 PROBLEM — E55.9 VITAMIN D DEFICIENCY: Status: RESOLVED | Noted: 2023-01-31 | Resolved: 2023-02-06

## 2023-02-06 PROCEDURE — 6370000000 HC RX 637 (ALT 250 FOR IP): Performed by: PSYCHIATRY & NEUROLOGY

## 2023-02-06 PROCEDURE — 5130000000 HC BRIDGE APPOINTMENT

## 2023-02-06 PROCEDURE — 6370000000 HC RX 637 (ALT 250 FOR IP)

## 2023-02-06 PROCEDURE — 6370000000 HC RX 637 (ALT 250 FOR IP): Performed by: STUDENT IN AN ORGANIZED HEALTH CARE EDUCATION/TRAINING PROGRAM

## 2023-02-06 RX ORDER — AMLODIPINE BESYLATE 5 MG/1
5 TABLET ORAL DAILY
Qty: 30 TABLET | Refills: 0 | Status: SHIPPED | OUTPATIENT
Start: 2023-02-07

## 2023-02-06 RX ORDER — BUPROPION HYDROCHLORIDE 300 MG/1
300 TABLET ORAL DAILY
Qty: 30 TABLET | Refills: 0 | Status: SHIPPED | OUTPATIENT
Start: 2023-02-07

## 2023-02-06 RX ORDER — AMLODIPINE BESYLATE 5 MG/1
5 TABLET ORAL DAILY
Status: DISCONTINUED | OUTPATIENT
Start: 2023-02-06 | End: 2023-02-06 | Stop reason: HOSPADM

## 2023-02-06 RX ORDER — ARIPIPRAZOLE 10 MG/1
10 TABLET ORAL DAILY
Qty: 30 TABLET | Refills: 0 | Status: SHIPPED | OUTPATIENT
Start: 2023-02-07

## 2023-02-06 RX ADMIN — NICOTINE POLACRILEX 4 MG: 4 GUM, CHEWING BUCCAL at 14:04

## 2023-02-06 RX ADMIN — LINACLOTIDE 145 MCG: 145 CAPSULE, GELATIN COATED ORAL at 05:35

## 2023-02-06 RX ADMIN — NICOTINE POLACRILEX 4 MG: 4 GUM, CHEWING BUCCAL at 10:13

## 2023-02-06 RX ADMIN — Medication 1000 UNITS: at 05:24

## 2023-02-06 RX ADMIN — ARIPIPRAZOLE 10 MG: 10 TABLET ORAL at 05:24

## 2023-02-06 RX ADMIN — AMLODIPINE BESYLATE 5 MG: 5 TABLET ORAL at 10:10

## 2023-02-06 RX ADMIN — NICOTINE POLACRILEX 4 MG: 4 GUM, CHEWING BUCCAL at 08:31

## 2023-02-06 RX ADMIN — CETIRIZINE HYDROCHLORIDE 10 MG: 10 TABLET ORAL at 05:24

## 2023-02-06 RX ADMIN — FOLIC ACID 1 MG: 1 TABLET ORAL at 05:24

## 2023-02-06 RX ADMIN — NICOTINE POLACRILEX 4 MG: 4 GUM, CHEWING BUCCAL at 05:29

## 2023-02-06 RX ADMIN — PANTOPRAZOLE SODIUM 40 MG: 40 TABLET, DELAYED RELEASE ORAL at 05:24

## 2023-02-06 RX ADMIN — CYANOCOBALAMIN TAB 500 MCG 1000 MCG: 500 TAB at 05:24

## 2023-02-06 RX ADMIN — BUPROPION HYDROCHLORIDE 300 MG: 300 TABLET, FILM COATED, EXTENDED RELEASE ORAL at 05:24

## 2023-02-06 NOTE — GROUP NOTE
Group Therapy Note    Date: 2/5/2023    Group Start Time: 2030  Group End Time: 2100  Group Topic: Wrap-Up    [address] [place] [address]        Group Therapy Note    Attendees: 13       Patient's Goal:  to stay in the present and keep moving forward    Notes:  met goal    Status After Intervention:  Unchanged    Participation Level: None    Participation Quality: Appropriate      Speech:  normal      Thought Process/Content: Logical      Affective Functioning: Congruent      Mood: euthymic      Level of consciousness:  Alert      Response to Learning: Able to verbalize current knowledge/experience      Endings: None Reported    Modes of Intervention: Support      Discipline Responsible: [address] [place]      Signature:  Siena Spearing

## 2023-02-06 NOTE — TELEPHONE ENCOUNTER
[facility] Initial Follow Up Call    Outreach made within 2 business days of discharge: Yes    Patient: Aguilar Lobato Patient : 2000   MRN: [id number]  Reason for Admission: No discharge information exists for this patient. Discharge Date:         Spoke with: JODI from [facility]    Discharge department/facility: [facility]    Non-face-to-face services provided:  Scheduled appointment with PCP-23    TCM Interactive Patient Contact:  Was patient able to fill all prescriptions: Yes  Was patient instructed to bring all medications to the follow-up visit: Yes  Is patient taking all medications as directed in the discharge summary?  Yes  Does patient understand their discharge instructions: Yes  Does patient have questions or concerns that need addressed prior to 7-14 day follow up office visit: no    Scheduled appointment with PCP within 7-14 days    Follow Up  Future Appointments   Date Time Provider Peter Pyle   2023  2:00 PM SIMRAN Ramos [address] [facility], RN

## 2023-02-06 NOTE — PLAN OF CARE
Patient alert and oriented x 3. Patient rate Depression 6/10 and Anxiety 6/10. Patient visible and social with peers. Patient denies SI/HI/A/V/H. Patient took HS medication. Patient denies self harm. No c/o's voiced @ present.

## 2023-02-06 NOTE — BH NOTE
[facility]  Discharge Note    Pt discharged with followings belongings:   Dental Appliances: None  Vision - Corrective Lenses: Eyeglasses  Hearing Aid: None  Jewelry: None  Body Piercings Removed: N/A  Clothing: Pants, Shirt, Undergarments, Jacket/Coat, Footwear, Socks  Other Valuables: Other (Comment) (none)   Valuables returned to patient. Patient educated on aftercare instructions: yes  Information faxed to [facility] by Puneet Gaxiola RN  at 2:53 PM .Patient verbalize understanding of AVS:  yes. Status EXAM upon discharge:  Mental Status and Behavioral Exam  Normal: No  Level of Assistance: Independent/Self  Facial Expression: Worried  Affect: Appropriate  Level of Consciousness: Alert  Frequency of Checks: 4 times per hour, close  Mood:Normal: No  Mood: Anxious  Motor Activity:Normal: Yes  Motor Activity: Increased  Eye Contact: Good  Observed Behavior: Cooperative  Sexual Misconduct History: Current - no  Preception: [place] to person, [place] to time, [place] to place, [place] to situation  Attention:Normal: Yes  Attention: Others (comment) (WNL)  Thought Processes: Circumstantial  Thought Content:Normal: No  Thought Content: Preoccupations  Depression Symptoms: Feelings of helplessness (\"I feel like my parents won't see the progress. \")  Anxiety Symptoms: Generalized  Antoinette Symptoms: No problems reported or observed. Hallucinations: None (Pt Denies)  Delusions: No  Memory:Normal: Yes  Insight and Judgment: No  Insight and Judgment: Poor judgment, Poor insight    Tobacco Screening:  Practical Counseling, on admission, pauly X, if applicable and completed (first 3 are required if patient doesn't refuse):             (x ) Recognizing danger situations (included triggers and roadblocks)                    (x ) Coping skills (new ways to manage stress,relaxation techniques, changing routine, distraction)                                                           ( x) Basic information about quitting (benefits of quitting, techniques in how to quit, available resources  ( ) Referral for counseling faxed to [facility]                                                                                                                   ( ) Patient refused counseling  ( ) Patient refused referral  ( ) Patient refused prescription upon discharge  ( ) Patient has not smoked in the last 30 days    Metabolic Screening:    No results found for: LABA1C    No results found for: CHOL  No results found for: TRIG  No results found for: HDL  No components found for: LDLCAL  No results found for: [address] [facility] Appointment completed: Reviewed Discharge Instructions with patient. Patient verbalizes understanding and agreement with the discharge plan using the teachback method.        Vaccinations (pauly X if applicable and completed):  ( ) Patient states already received influenza vaccine elsewhere  ( ) Patient received influenza vaccine during this hospitalization  (x ) Patient refused influenza vaccine at this time

## 2023-02-06 NOTE — PLAN OF CARE
Patient's BP has been persistently elevated x 3 days with a mean BP of 153/96. Will initiate amlodipine 5 mg daily. Patient should follow up with PCP in 7-10 days for BP recheck and possible medication dose adjustment.      Rena Palma PA-C  2/6/2023 9:34 AM

## 2023-02-06 NOTE — GROUP NOTE
Group Therapy Note    Date: 2/6/2023    Group Start Time: 1100  Group End Time: 6281  Group Topic: Psychoeducation    [facility]    SUSAN Barnhart        Group Therapy Note  Clinician introduced the group members to their baseline of functioning and did a role play with 2 of the group members. Clinician then introduced the group to grounding techniques and mindfulness to return to Zero on their baseline. Attendees: 12       Patient's Goal:      Notes:  Patient was cooperative and engaged in the group discussion and activities. Patient participated in the grounding and mindfulness practices. Status After Intervention:  Improved    Participation Level:  Active Listener and Interactive    Participation Quality: Appropriate      Speech:  normal      Thought Process/Content: Logical      Affective Functioning: Congruent      Mood: anxious      Level of consciousness:  Attentive      Response to Learning: Able to verbalize/acknowledge new learning      Endings: None Reported    Modes of Intervention: Education, Support, and Activity      Discipline Responsible: /Counselor      Signature:  SUSAN Barnhart

## 2023-02-06 NOTE — BH NOTE
Purposeful Rounding    Patient Location: Day room    Patient willing to engage in conversation: Yes    Presentation/behavior: Cooperative    Affect: Anxious    Concerns reported: None    PRN medications given: N/A    Environmental assessment: No safety hazards noted    Fall prevention interventions in place: Lighting appropriate, Room free of clutter, and Clear path to bathroom

## 2023-02-06 NOTE — PLAN OF CARE
Problem: Safety - Adult  Goal: Free from fall injury  Outcome: Progressing     Problem: Anxiety  Goal: Will report anxiety at manageable levels  Description: INTERVENTIONS:  1. Administer medication as ordered  2. Teach and rehearse alternative coping skills  3. Provide emotional support with 1:1 interaction with staff  Outcome: Progressing     Problem: Coping  Goal: Pt/Family able to verbalize concerns and demonstrate effective coping strategies  Description: INTERVENTIONS:  1. Assist patient/family to identify coping skills, available support systems and cultural and spiritual values  2. Provide emotional support, including active listening and acknowledgement of concerns of patient and caregivers  3. Reduce environmental stimuli, as able  4. Instruct patient/family in relaxation techniques, as appropriate  5. Assess for spiritual pain/suffering and initiate Spiritual Care, Psychosocial Clinical Specialist consults as needed  Outcome: Progressing     Problem: Change in Body Image  Goal: Pt/Family communicate acceptance of loss or change in body image and feel psychological comfort and peace  Description: INTERVENTIONS:  1. Assess patient/family anxiety and grief process related to change in body image, loss of functional status, loss of sense of self, and forgiveness  2. Provide emotional and spiritual support  3. Provide information about the patient's health status with consideration of family and cultural values  4. Communicate willingness to discuss loss and facilitate grief process with patient/family as appropriate  5. Emphasize sustaining relationships within family system and community, or pranay/spiritual traditions  6. Initiate Spiritual Care, Psychosocial Clinical Specialist consult as needed  Outcome: Progressing     Problem: Depression/Self Harm  Goal: Effect of psychiatric condition will be minimized and patient will be protected from self harm  Description: INTERVENTIONS:  1.  Assess impact of patient's symptoms on level of functioning, self care needs and offer support as indicated  2. Assess patient/family knowledge of depression, impact on illness and need for teaching  3. Provide emotional support, presence and reassurance  4. Assess for possible suicidal thoughts or ideation. If patient expresses suicidal thoughts or statements do not leave alone, initiate Suicide Precautions, move to a room close to the nursing station and obtain sitter  5. Initiate consults as appropriate with Mental Health Professional, Spiritual Care, Psychosocial CNS, and consider a recommendation to the [facility] for a Psychiatric Consultation  Outcome: Progressing     Problem: Pain  Goal: Verbalizes/displays adequate comfort level or baseline comfort level  Outcome: Progressing    Patient is interactive with staff. Patient denies SI/HI/AVH. Patient states, \"I haven't been sleeping well. I've only been sleeping about four hours the last two nights. \" He reports feeling \"still anxious. I don't think my parents will see progress when I go home. \" Patient compliant with medications. Patient is cooperative.

## 2023-02-14 ENCOUNTER — OFFICE VISIT (OUTPATIENT)
Dept: FAMILY MEDICINE CLINIC | Age: 23
End: 2023-02-14
Payer: COMMERCIAL

## 2023-02-14 VITALS
OXYGEN SATURATION: 97 % | BODY MASS INDEX: 26.51 KG/M2 | DIASTOLIC BLOOD PRESSURE: 80 MMHG | WEIGHT: 179 LBS | TEMPERATURE: 98.7 F | SYSTOLIC BLOOD PRESSURE: 118 MMHG | HEART RATE: 89 BPM | HEIGHT: 69 IN

## 2023-02-14 DIAGNOSIS — Z09 HOSPITAL DISCHARGE FOLLOW-UP: Primary | ICD-10-CM

## 2023-02-14 DIAGNOSIS — R10.84 GENERALIZED ABDOMINAL PAIN: ICD-10-CM

## 2023-02-14 DIAGNOSIS — F10.20 ALCOHOL USE DISORDER, MODERATE, DEPENDENCE (HCC): ICD-10-CM

## 2023-02-14 DIAGNOSIS — F33.1 MODERATE EPISODE OF RECURRENT MAJOR DEPRESSIVE DISORDER (HCC): ICD-10-CM

## 2023-02-14 DIAGNOSIS — F12.20 CANNABIS USE DISORDER, SEVERE, DEPENDENCE (HCC): ICD-10-CM

## 2023-02-14 DIAGNOSIS — Z74.8 ASSISTANCE NEEDED WITH TRANSPORTATION: ICD-10-CM

## 2023-02-14 DIAGNOSIS — I10 ESSENTIAL HYPERTENSION: ICD-10-CM

## 2023-02-14 DIAGNOSIS — F60.89 CLUSTER C PERSONALITY DISORDER (HCC): ICD-10-CM

## 2023-02-14 DIAGNOSIS — R19.7 DIARRHEA, UNSPECIFIED TYPE: ICD-10-CM

## 2023-02-14 PROCEDURE — 99214 OFFICE O/P EST MOD 30 MIN: CPT | Performed by: PHYSICIAN ASSISTANT

## 2023-02-14 PROCEDURE — 1111F DSCHRG MED/CURRENT MED MERGE: CPT | Performed by: PHYSICIAN ASSISTANT

## 2023-02-14 RX ORDER — ARIPIPRAZOLE 10 MG/1
10 TABLET ORAL DAILY
Qty: 30 TABLET | Refills: 1 | Status: SHIPPED | OUTPATIENT
Start: 2023-02-14

## 2023-02-14 RX ORDER — AMLODIPINE BESYLATE 5 MG/1
5 TABLET ORAL DAILY
Qty: 30 TABLET | Refills: 3 | Status: SHIPPED | OUTPATIENT
Start: 2023-02-14

## 2023-02-14 RX ORDER — BUPROPION HYDROCHLORIDE 300 MG/1
300 TABLET ORAL DAILY
Qty: 30 TABLET | Refills: 1 | Status: SHIPPED | OUTPATIENT
Start: 2023-02-14

## 2023-02-14 SDOH — ECONOMIC STABILITY: FOOD INSECURITY: WITHIN THE PAST 12 MONTHS, THE FOOD YOU BOUGHT JUST DIDN'T LAST AND YOU DIDN'T HAVE MONEY TO GET MORE.: NEVER TRUE

## 2023-02-14 SDOH — ECONOMIC STABILITY: INCOME INSECURITY: HOW HARD IS IT FOR YOU TO PAY FOR THE VERY BASICS LIKE FOOD, HOUSING, MEDICAL CARE, AND HEATING?: NOT HARD AT ALL

## 2023-02-14 SDOH — ECONOMIC STABILITY: HOUSING INSECURITY
IN THE LAST 12 MONTHS, WAS THERE A TIME WHEN YOU DID NOT HAVE A STEADY PLACE TO SLEEP OR SLEPT IN A SHELTER (INCLUDING NOW)?: YES

## 2023-02-14 SDOH — ECONOMIC STABILITY: FOOD INSECURITY: WITHIN THE PAST 12 MONTHS, YOU WORRIED THAT YOUR FOOD WOULD RUN OUT BEFORE YOU GOT MONEY TO BUY MORE.: NEVER TRUE

## 2023-02-14 ASSESSMENT — ANXIETY QUESTIONNAIRES
1. FEELING NERVOUS, ANXIOUS, OR ON EDGE: 3
IF YOU CHECKED OFF ANY PROBLEMS ON THIS QUESTIONNAIRE, HOW DIFFICULT HAVE THESE PROBLEMS MADE IT FOR YOU TO DO YOUR WORK, TAKE CARE OF THINGS AT HOME, OR GET ALONG WITH OTHER PEOPLE: EXTREMELY DIFFICULT
3. WORRYING TOO MUCH ABOUT DIFFERENT THINGS: 3
7. FEELING AFRAID AS IF SOMETHING AWFUL MIGHT HAPPEN: 2
5. BEING SO RESTLESS THAT IT IS HARD TO SIT STILL: 2
2. NOT BEING ABLE TO STOP OR CONTROL WORRYING: 3
4. TROUBLE RELAXING: 2
GAD7 TOTAL SCORE: 16
6. BECOMING EASILY ANNOYED OR IRRITABLE: 1

## 2023-02-14 ASSESSMENT — ENCOUNTER SYMPTOMS
ANAL BLEEDING: 0
DIARRHEA: 0
CONSTIPATION: 0
ABDOMINAL PAIN: 1
ABDOMINAL DISTENTION: 0
NAUSEA: 1
RECTAL PAIN: 0
BLOOD IN STOOL: 1
VOMITING: 0

## 2023-02-14 ASSESSMENT — PATIENT HEALTH QUESTIONNAIRE - PHQ9
SUM OF ALL RESPONSES TO PHQ QUESTIONS 1-9: 14
SUM OF ALL RESPONSES TO PHQ QUESTIONS 1-9: 14
SUM OF ALL RESPONSES TO PHQ9 QUESTIONS 1 & 2: 4
3. TROUBLE FALLING OR STAYING ASLEEP: 0
10. IF YOU CHECKED OFF ANY PROBLEMS, HOW DIFFICULT HAVE THESE PROBLEMS MADE IT FOR YOU TO DO YOUR WORK, TAKE CARE OF THINGS AT HOME, OR GET ALONG WITH OTHER PEOPLE: 2
2. FEELING DOWN, DEPRESSED OR HOPELESS: 2
6. FEELING BAD ABOUT YOURSELF - OR THAT YOU ARE A FAILURE OR HAVE LET YOURSELF OR YOUR FAMILY DOWN: 3
1. LITTLE INTEREST OR PLEASURE IN DOING THINGS: 2
8. MOVING OR SPEAKING SO SLOWLY THAT OTHER PEOPLE COULD HAVE NOTICED. OR THE OPPOSITE, BEING SO FIGETY OR RESTLESS THAT YOU HAVE BEEN MOVING AROUND A LOT MORE THAN USUAL: 1
9. THOUGHTS THAT YOU WOULD BE BETTER OFF DEAD, OR OF HURTING YOURSELF: 0
5. POOR APPETITE OR OVEREATING: 2
SUM OF ALL RESPONSES TO PHQ QUESTIONS 1-9: 14
7. TROUBLE CONCENTRATING ON THINGS, SUCH AS READING THE NEWSPAPER OR WATCHING TELEVISION: 2
SUM OF ALL RESPONSES TO PHQ QUESTIONS 1-9: 14
4. FEELING TIRED OR HAVING LITTLE ENERGY: 2

## 2023-02-14 NOTE — PROGRESS NOTES
Post-Discharge Transitional Care Follow Up      Med Scott   YOB: 2000    Date of Office Visit:  2/14/2023  Date of Hospital Admission: 1/29/23  Date of Hospital Discharge: 2/6/23  Readmission Risk Score (high >=14%. Medium >=10%):Readmission Risk Score: 7.8      Care management risk score Rising risk (score 2-5) and Complex Care (Scores >=6): No Risk Score On File     Non face to face  following discharge, date last encounter closed (first attempt may have been earlier): 02/06/2023     Call initiated 2 business days of discharge: Yes     Hospital discharge follow-up  -     [place] DISCHARGE MEDS RECONCILED W/ CURRENT OUTPATIENT MED LIST  Moderate episode of recurrent major depressive disorder (HCC)  -     ARIPiprazole (ABILIFY) 10 MG tablet; Take 1 tablet by mouth daily, Disp-30 tablet, R-1Normal  -     buPROPion (WELLBUTRIN XL) 300 MG extended release tablet; Take 1 tablet by mouth daily, Disp-30 tablet, R-1Normal  -    continue with current medications. Follow up with [facility]. Will connect him with social work so he can get help with transportation to appointments as well as housing  Essential hypertension  -     amLODIPine (NORVASC) 5 MG tablet; Take 1 tablet by mouth daily, Disp-30 tablet, R-3Normal  -    well controlled, continue with norvasc. Follow up in 6 months  Assistance needed with transportation  -     [facility] [address] [facility] [facility]  Diarrhea, unspecified type  -     CALPROTECTIN STOOL; Future  -     C-Reactive Protein; Future  -     TSH with Reflex; Future  -     CT ABDOMEN PELVIS W IV CONTRAST Additional Contrast? Oral; Future  -     rule out IBD. Has a history of of IBS.  May need follow up with gastro  Generalized abdominal pain  -     CT ABDOMEN PELVIS W IV CONTRAST Additional Contrast? Oral; Future  Cluster C personality disorder ([address]        -    follow up with therapist at [facility]  Alcohol use disorder, moderate, dependence ([address]       -    pt to abstain from alcohol  Cannabis use disorder, severe, dependence ([address]       -     pt to abstain from cannabis  Medical Decision Making: high complexity  Return in about 6 months (around 8/14/2023) for HTN. Subjective:   HPI    Inpatient course: Discharge summary reviewed- see chart. Interval history/Current status:     Mood feels somewhat improved since hospitalization. He feels good on the increased Abilify and Wellbutrin however, he is noting weight gain which is not happy about. Reports reduced worry and depression. Able to keep a clear train of thought. Denies any suicidal or homicidal thoughts, nor any self harming. The pt is having trouble focusing at work. Reports that he was on a stimulant in grade school and would like to restart. Has been contacted by [facility] for housing. Has an intake appointment with them for mental health services. He reports difficulty finding rides to make the early appointment time. He continues to feel like his family is not very supportic\ve    Blood pressure was elevated in the hospital so the pt was placed on amlodipine. He is tolerating well without side effect.     Abdominal pain  Symptoms started one year ago  Location: generalized abdominal pain  Almost all the time  Aggravated by eating protein and solid food  Improved by drinking shakes  Associated symptoms: can be multiple times per day, occasional mucous in the stool, rare nausea  Denies: blood in his stool, vomiting  Current medication: GI    Patient Active Problem List   Diagnosis    ZAY (generalized anxiety disorder)    Irritable bowel syndrome with constipation    Depression    Cannabis use disorder, severe, dependence ([address]    Cluster C personality disorder ([address]    Encounter for general medical examination    Allergic rhinitis    Gastroesophageal reflux disease    Tobacco use    Alcohol use disorder, moderate, dependence ([address]    Essential hypertension       Medications listed as ordered at the time of discharge from hospital     Medication List            Accurate as of February 14, 2023  1:04 PM. If you have any questions, ask your nurse or doctor. CONTINUE taking these medications      amLODIPine 5 MG tablet  Commonly known as: NORVASC  Take 1 tablet by mouth daily     ARIPiprazole 10 MG tablet  Commonly known as: ABILIFY  Take 1 tablet by mouth daily     buPROPion 300 MG extended release tablet  Commonly known as: WELLBUTRIN XL  Take 1 tablet by mouth daily     cetirizine 10 MG tablet  Commonly known as: ZYRTEC     folic acid 1 MG tablet  Commonly known as: FOLVITE  Take 1 tablet by mouth daily     lansoprazole 30 MG delayed release capsule  Commonly known as: PREVACID  TAKE 1 CAPSULE BY MOUTH EVERY DAY     Linzess 145 MCG capsule  Generic drug: linaclotide     vitamin B-12 1000 MCG tablet  Commonly known as: CYANOCOBALAMIN  Take 1 tablet by mouth daily     Vitamin D-[address] 25 MCG (1000 UT) Tabs tablet  Generic drug: vitamin D  TAKE 1 TABLET BY MOUTH EVERY DAY               Where to Get Your Medications        These medications were sent to [facility]/pharmacy #[id number] [address] [phone] - F [phone]  [address] [place] [id number]      Phone: [phone]   amLODIPine 5 MG tablet  ARIPiprazole 10 MG tablet  buPROPion 300 MG extended release tablet          Medications marked \"taking\" at this time  Outpatient Medications Marked as Taking for the 2/14/23 encounter (Office Visit) with SIMRAN Coyle   Medication Sig Dispense Refill    ARIPiprazole (ABILIFY) 10 MG tablet Take 1 tablet by mouth daily 30 tablet 1    amLODIPine (NORVASC) 5 MG tablet Take 1 tablet by mouth daily 30 tablet 3    buPROPion (WELLBUTRIN XL) 300 MG extended release tablet Take 1 tablet by mouth daily 30 tablet 1    VITAMIN D-1000 MAX ST 25 MCG (1000 UT) TABS tablet TAKE 1 TABLET BY MOUTH EVERY DAY 30 tablet 5    lansoprazole (PREVACID) 30 MG delayed release capsule TAKE 1 CAPSULE BY MOUTH EVERY DAY 90 capsule 1    folic acid (FOLVITE) 1 MG tablet Take 1 tablet by mouth daily 90 tablet 3    vitamin B-12 (CYANOCOBALAMIN) 1000 MCG tablet Take 1 tablet by mouth daily 90 tablet 3    cetirizine (ZYRTEC) 10 MG tablet Take 10 mg by mouth daily      LINZESS 145 MCG capsule Take 145 mcg by mouth daily          Medications patient taking as of now reconciled against medications ordered at time of hospital discharge: Yes    Review of Systems   Constitutional:  Positive for appetite change and unexpected weight change. Negative for activity change and fatigue. Gastrointestinal:  Positive for abdominal pain, blood in stool and nausea. Negative for abdominal distention, anal bleeding, constipation, diarrhea, rectal pain and vomiting. Neurological:  Negative for dizziness and headaches. Psychiatric/Behavioral:  Positive for agitation, decreased concentration and dysphoric mood. Negative for behavioral problems, confusion, hallucinations, self-injury, sleep disturbance and suicidal ideas. The patient is not nervous/anxious and is not hyperactive. Objective:    /80 (Site: Right Upper Arm, Position: Sitting, Cuff Size: Medium Adult)   Pulse 89   Temp 98.7 °F (37.1 °C) (Oral)   Ht 5' 9\" (1.753 m)   Wt 179 lb (81.2 kg)   SpO2 97%   BMI 26.43 kg/m²   Physical Exam  Vitals reviewed. Constitutional:       Appearance: Normal appearance. Cardiovascular:      Rate and Rhythm: Normal rate and regular rhythm. Heart sounds: Normal heart sounds. Pulmonary:      Effort: Pulmonary effort is normal.      Breath sounds: Normal breath sounds. Abdominal:      General: Abdomen is flat. Bowel sounds are normal.      Palpations: Abdomen is soft. Tenderness: There is abdominal tenderness in the right lower quadrant, periumbilical area, left upper quadrant and left lower quadrant. There is no guarding or rebound. Neurological:      Mental Status: He is alert.        An electronic signature was used to authenticate this note.   --Lowry Barthel, PA

## 2023-02-18 DIAGNOSIS — K21.9 GASTROESOPHAGEAL REFLUX DISEASE, UNSPECIFIED WHETHER ESOPHAGITIS PRESENT: ICD-10-CM

## 2023-02-18 NOTE — TELEPHONE ENCOUNTER
Refill Request     CONFIRM preferrred pharmacy with the patient. If Mail Order Rx - Pend for 90 day refill. Last Seen: Last Seen Department: 2/14/2023  Last Seen by PCP: 2/14/2023    Last Written: 9/19/2022    If no future appointment scheduled, route STAFF MESSAGE with patient name to the [facility] for scheduling. Next Appointment:   Future Appointments   Date Time Provider Peter Pyle   4/14/2023  1:00 PM SIMRAN Nicholas - JAVI       Message sent to [address] [place] [address] to schedule appt with patient?   NO      Requested Prescriptions     Pending Prescriptions Disp Refills    lansoprazole (PREVACID) 30 MG delayed release capsule [Pharmacy Med Name: LANSOPRAZOLE DR 30 MG CAPSULE] 90 capsule 1     Sig: TAKE 1 CAPSULE BY MOUTH EVERY DAY

## 2023-02-20 RX ORDER — LANSOPRAZOLE 30 MG/1
CAPSULE, DELAYED RELEASE ORAL
Qty: 90 CAPSULE | Refills: 1 | Status: SHIPPED | OUTPATIENT
Start: 2023-02-20

## 2023-02-23 ENCOUNTER — TELEPHONE (OUTPATIENT)
Dept: OTHER | Age: 23
End: 2023-02-23

## 2023-02-28 ENCOUNTER — HOSPITAL ENCOUNTER (OUTPATIENT)
Age: 23
Setting detail: SPECIMEN
Discharge: HOME | End: 2023-02-28
Payer: COMMERCIAL

## 2023-02-28 PROCEDURE — 83993 ASSAY FOR CALPROTECTIN FECAL: CPT

## 2023-03-01 DIAGNOSIS — R19.7 DIARRHEA, UNSPECIFIED TYPE: ICD-10-CM

## 2023-03-04 LAB — CALPROTECTIN, FECAL: 76 UG/G

## 2023-03-06 DIAGNOSIS — R19.5 ELEVATED FECAL CALPROTECTIN: Primary | ICD-10-CM

## 2023-03-06 DIAGNOSIS — R19.7 DIARRHEA, UNSPECIFIED TYPE: ICD-10-CM

## 2023-04-01 DIAGNOSIS — I10 ESSENTIAL HYPERTENSION: ICD-10-CM

## 2023-04-03 RX ORDER — AMLODIPINE BESYLATE 5 MG/1
TABLET ORAL
Qty: 90 TABLET | Refills: 1 | Status: SHIPPED | OUTPATIENT
Start: 2023-04-03

## 2023-04-06 ENCOUNTER — OFFICE VISIT (OUTPATIENT)
Dept: FAMILY MEDICINE CLINIC | Age: 23
End: 2023-04-06
Payer: COMMERCIAL

## 2023-04-06 VITALS
OXYGEN SATURATION: 97 % | DIASTOLIC BLOOD PRESSURE: 60 MMHG | BODY MASS INDEX: 26.88 KG/M2 | HEART RATE: 83 BPM | SYSTOLIC BLOOD PRESSURE: 100 MMHG | WEIGHT: 182 LBS

## 2023-04-06 DIAGNOSIS — R19.5 ELEVATED FECAL CALPROTECTIN: ICD-10-CM

## 2023-04-06 DIAGNOSIS — F51.05 INSOMNIA DUE TO OTHER MENTAL DISORDER: ICD-10-CM

## 2023-04-06 DIAGNOSIS — F99 INSOMNIA DUE TO OTHER MENTAL DISORDER: ICD-10-CM

## 2023-04-06 DIAGNOSIS — F33.1 MODERATE EPISODE OF RECURRENT MAJOR DEPRESSIVE DISORDER (HCC): Primary | ICD-10-CM

## 2023-04-06 PROBLEM — F32.A DEPRESSION: Status: RESOLVED | Noted: 2023-01-30 | Resolved: 2023-04-06

## 2023-04-06 PROCEDURE — G8419 CALC BMI OUT NRM PARAM NOF/U: HCPCS | Performed by: PHYSICIAN ASSISTANT

## 2023-04-06 PROCEDURE — G8427 DOCREV CUR MEDS BY ELIG CLIN: HCPCS | Performed by: PHYSICIAN ASSISTANT

## 2023-04-06 PROCEDURE — 1036F TOBACCO NON-USER: CPT | Performed by: PHYSICIAN ASSISTANT

## 2023-04-06 PROCEDURE — 99214 OFFICE O/P EST MOD 30 MIN: CPT | Performed by: PHYSICIAN ASSISTANT

## 2023-04-06 PROCEDURE — 3078F DIAST BP <80 MM HG: CPT | Performed by: PHYSICIAN ASSISTANT

## 2023-04-06 PROCEDURE — 3074F SYST BP LT 130 MM HG: CPT | Performed by: PHYSICIAN ASSISTANT

## 2023-04-06 RX ORDER — ARIPIPRAZOLE 10 MG/1
10 TABLET ORAL DAILY
Qty: 90 TABLET | Refills: 1 | Status: SHIPPED | OUTPATIENT
Start: 2023-04-06

## 2023-04-06 RX ORDER — BUPROPION HYDROCHLORIDE 300 MG/1
300 TABLET ORAL DAILY
Qty: 90 TABLET | Refills: 1 | Status: SHIPPED | OUTPATIENT
Start: 2023-04-06

## 2023-04-06 RX ORDER — TRAZODONE HYDROCHLORIDE 50 MG/1
50 TABLET ORAL NIGHTLY PRN
Qty: 30 TABLET | Refills: 2 | Status: SHIPPED | OUTPATIENT
Start: 2023-04-06

## 2023-04-06 ASSESSMENT — ENCOUNTER SYMPTOMS
ABDOMINAL DISTENTION: 1
NAUSEA: 1
VOMITING: 0
CONSTIPATION: 0
BLOOD IN STOOL: 0
DIARRHEA: 1
ABDOMINAL PAIN: 1

## 2023-04-06 NOTE — PROGRESS NOTES
multiple times per day, occasional mucous in the stool, rare nausea  Denies: blood in his stool, vomiting  Current medication: GI  + fecal calprotectin    Review of Systems   Constitutional:  Negative for diaphoresis, fatigue and unexpected weight change. Gastrointestinal:  Positive for abdominal distention, abdominal pain, diarrhea and nausea. Negative for blood in stool, constipation and vomiting. Psychiatric/Behavioral:  Positive for decreased concentration, dysphoric mood and sleep disturbance. Negative for agitation, behavioral problems, confusion, hallucinations, self-injury and suicidal ideas. The patient is not nervous/anxious and is not hyperactive. Objective   Physical Exam  Vitals reviewed. Constitutional:       Appearance: Normal appearance. He is normal weight. Musculoskeletal:      Right lower leg: No edema. Left lower leg: No edema. Neurological:      General: No focal deficit present. Mental Status: He is alert and oriented to person, place, and time. Mental status is at baseline. Psychiatric:         Attention and Perception: Attention and perception normal.         Mood and Affect: Mood normal. Affect is flat. Speech: Speech normal.         Behavior: Behavior normal. Behavior is cooperative. Thought Content: Thought content normal.         Cognition and Memory: Cognition and memory normal.         Judgment: Judgment normal.      Comments: Poor eye contact              An electronic signature was used to authenticate this note.     --SIMRAN Gamble

## 2023-04-06 NOTE — PATIENT INSTRUCTIONS
[facility]  Address: [address] [place] [address], [facility], [address] [facility],[address]  Hours:   Open ? Closes 5? PM  Phone: [phone]

## 2023-05-30 ENCOUNTER — OFFICE VISIT (OUTPATIENT)
Dept: PSYCHOLOGY | Age: 23
End: 2023-05-30

## 2023-05-30 DIAGNOSIS — F33.1 MODERATE EPISODE OF RECURRENT MAJOR DEPRESSIVE DISORDER (HCC): Primary | ICD-10-CM

## 2023-05-30 DIAGNOSIS — F41.1 GAD (GENERALIZED ANXIETY DISORDER): ICD-10-CM

## 2023-05-30 ASSESSMENT — PATIENT HEALTH QUESTIONNAIRE - PHQ9
3. TROUBLE FALLING OR STAYING ASLEEP: 1
8. MOVING OR SPEAKING SO SLOWLY THAT OTHER PEOPLE COULD HAVE NOTICED. OR THE OPPOSITE, BEING SO FIGETY OR RESTLESS THAT YOU HAVE BEEN MOVING AROUND A LOT MORE THAN USUAL: 1
10. IF YOU CHECKED OFF ANY PROBLEMS, HOW DIFFICULT HAVE THESE PROBLEMS MADE IT FOR YOU TO DO YOUR WORK, TAKE CARE OF THINGS AT HOME, OR GET ALONG WITH OTHER PEOPLE: 1
6. FEELING BAD ABOUT YOURSELF - OR THAT YOU ARE A FAILURE OR HAVE LET YOURSELF OR YOUR FAMILY DOWN: 3
SUM OF ALL RESPONSES TO PHQ QUESTIONS 1-9: 10
SUM OF ALL RESPONSES TO PHQ9 QUESTIONS 1 & 2: 3
7. TROUBLE CONCENTRATING ON THINGS, SUCH AS READING THE NEWSPAPER OR WATCHING TELEVISION: 0
1. LITTLE INTEREST OR PLEASURE IN DOING THINGS: 1
SUM OF ALL RESPONSES TO PHQ QUESTIONS 1-9: 10
5. POOR APPETITE OR OVEREATING: 2
4. FEELING TIRED OR HAVING LITTLE ENERGY: 0
SUM OF ALL RESPONSES TO PHQ QUESTIONS 1-9: 10
SUM OF ALL RESPONSES TO PHQ QUESTIONS 1-9: 10
2. FEELING DOWN, DEPRESSED OR HOPELESS: 2
9. THOUGHTS THAT YOU WOULD BE BETTER OFF DEAD, OR OF HURTING YOURSELF: 0

## 2023-05-30 ASSESSMENT — ANXIETY QUESTIONNAIRES
2. NOT BEING ABLE TO STOP OR CONTROL WORRYING: 3
GAD7 TOTAL SCORE: 16
4. TROUBLE RELAXING: 2
6. BECOMING EASILY ANNOYED OR IRRITABLE: 0
3. WORRYING TOO MUCH ABOUT DIFFERENT THINGS: 3
5. BEING SO RESTLESS THAT IT IS HARD TO SIT STILL: 3
1. FEELING NERVOUS, ANXIOUS, OR ON EDGE: 3
7. FEELING AFRAID AS IF SOMETHING AWFUL MIGHT HAPPEN: 2
IF YOU CHECKED OFF ANY PROBLEMS ON THIS QUESTIONNAIRE, HOW DIFFICULT HAVE THESE PROBLEMS MADE IT FOR YOU TO DO YOUR WORK, TAKE CARE OF THINGS AT HOME, OR GET ALONG WITH OTHER PEOPLE: EXTREMELY DIFFICULT

## 2023-05-30 NOTE — PROGRESS NOTES
Behavioral Health Consultation  [facility], Elizabeth  Psychologist  5/30/2023  1:38 PM EDT    Time spent with Patient: 60 minutes  This is patient's first [facility] appointment. Reason for Consult:    Chief Complaint   Patient presents with    Depression    Anxiety    Other     inattention     Referring Provider: SIMARN Diehl      Pt provided informed consent for the behavioral health program. Discussed with patient model of service to include the limits of confidentiality (i.e. abuse reporting, suicide intervention, etc.) and short-term intervention focused approach. Pt indicated understanding. Feedback given to PCP. S:  Pt seen with mother, Chanelle Hensley. Pt reports he wants to take something for his ADHD. Is on Wellbutrin and was hoping that would help. Was dx in 1st grade. Was medicated until 2 years ago. Tried to manage on his own. Was dx by pediatrician. Tried different medication and ended up on Adderall. Didn't have IEP in school. Dropped out in garrett year. Struggling with forgetfulness, disorganized, is spacey, all over the place. Will leave cabinet doors open. Is fidgety. Is having problems at work. Is being told he is not keeping up or progressing. Is being told he is a rollercoaster as far as being able to stay on task. 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 When not on task, looks like he is \"panicky\" - has to go outside to breathe. Is anxious there everyday. Is the only one there that does \"main prep\" for food. Is being singled-out. Environment is cliquey. Breathing and BP issues dx when in the hospital. Happens mostly when anxious and also happens if he gets too physically active- has to pace himself. ANxiety triggered when he can hear people say things about him. They don't know why he freezes or can't keep up. Has only worked in [employer] and also [employer].  Was cook at other places too- kept up ok except at [address] towards end of time there because

## 2023-06-01 ENCOUNTER — TELEPHONE (OUTPATIENT)
Dept: FAMILY MEDICINE CLINIC | Age: 23
End: 2023-06-01

## 2023-06-01 NOTE — TELEPHONE ENCOUNTER
Tompkins, can you please call the patient?  I doubt insurance would cover this now since it was ordered four months ago

## 2023-06-01 NOTE — TELEPHONE ENCOUNTER
Pt called in stating he has questions regarding a CIT scan that he didn't follow up on and he also has other private questions that he did not give me to ask. Would like to speak with PCP or Catarina.

## 2023-06-02 NOTE — TELEPHONE ENCOUNTER
Spoke to patient he was just asking if he can still get his CT Scan, informed him if he was still having issues he can call since the order is still good

## 2023-06-05 RX ORDER — PSYLLIUM HUSK 0.4 G
CAPSULE ORAL
Qty: 90 TABLET | Refills: 1 | Status: SHIPPED | OUTPATIENT
Start: 2023-06-05

## 2023-06-05 NOTE — TELEPHONE ENCOUNTER
Refill Request     CONFIRM preferred pharmacy with the patient. If Mail Order Rx - Pend for 90 day refill. Last Seen: Last Seen Department: 4/6/2023  Last Seen by PCP: 4/6/2023    Last Written: 12/5/2022 30 tablet 5 refills    If no future appointment scheduled:  Review the last OV with PCP and review information for follow-up visit,  Route STAFF MESSAGE with patient name to the [facility] for scheduling with the following information:            -  Timing of next visit           -  Visit type ie Physical, OV, etc           -  Diagnoses/Reason ie. COPD, HTN - Do not use MEDICATION, Follow-up or CHECK UP - Give reason for visit      Next Appointment:   Future Appointments   Date Time Provider Peter Pyle   6/20/2023 12:00 PM [facility] [employer]       Message sent to [address] [place] [address] to schedule appt with patient?   N/A      Requested Prescriptions     Pending Prescriptions Disp Refills    VITAMIN D-1000 MAX ST 25 MCG (1000 UT) TABS tablet [Pharmacy Med Name: VITAMIN D3 25 MCG TABLET] 90 tablet 1     Sig: TAKE 1 TABLET BY MOUTH EVERY DAY

## 2023-06-13 RX ORDER — FOLIC ACID 1 MG/1
TABLET ORAL
Qty: 90 TABLET | Refills: 0 | Status: SHIPPED | OUTPATIENT
Start: 2023-06-13

## 2023-06-13 NOTE — TELEPHONE ENCOUNTER
Refill Request     CONFIRM preferred pharmacy with the patient. If Mail Order Rx - Pend for 90 day refill. Last Seen: Last Seen Department: 4/6/2023  Last Seen by PCP: 4/6/2023    Last Written: 07/09/2022 90 tablet 3 refills     If no future appointment scheduled:  Review the last OV with PCP and review information for follow-up visit,  Route STAFF MESSAGE with patient name to the [facility] for scheduling with the following information:            -  Timing of next visit           -  Visit type ie Physical, OV, etc           -  Diagnoses/Reason ie. COPD, HTN - Do not use MEDICATION, Follow-up or CHECK UP - Give reason for visit      Next Appointment:   Future Appointments   Date Time Provider [facility] [address]   6/20/2023 12:00 PM [address] [place] [address] [employer]       Message sent to [address] [place] [address] to schedule appt with patient?   N/A      Requested Prescriptions     Pending Prescriptions Disp Refills    folic acid (FOLVITE) 1 MG tablet [Pharmacy Med Name: FOLIC ACID 1 MG TABLET] 90 tablet 3     Sig: TAKE 1 TABLET BY MOUTH EVERY DAY

## 2023-06-20 ENCOUNTER — OFFICE VISIT (OUTPATIENT)
Dept: PSYCHOLOGY | Age: 23
End: 2023-06-20
Payer: COMMERCIAL

## 2023-06-20 DIAGNOSIS — Z72.0 TOBACCO USE: ICD-10-CM

## 2023-06-20 DIAGNOSIS — F12.20 CANNABIS USE DISORDER, SEVERE, DEPENDENCE (HCC): ICD-10-CM

## 2023-06-20 DIAGNOSIS — F41.1 GAD (GENERALIZED ANXIETY DISORDER): Primary | ICD-10-CM

## 2023-06-20 DIAGNOSIS — F33.1 MODERATE EPISODE OF RECURRENT MAJOR DEPRESSIVE DISORDER (HCC): ICD-10-CM

## 2023-06-20 PROCEDURE — 1036F TOBACCO NON-USER: CPT | Performed by: PSYCHOLOGIST

## 2023-06-20 PROCEDURE — 90791 PSYCH DIAGNOSTIC EVALUATION: CPT | Performed by: PSYCHOLOGIST

## 2023-06-20 ASSESSMENT — ANXIETY QUESTIONNAIRES
2. NOT BEING ABLE TO STOP OR CONTROL WORRYING: 2
1. FEELING NERVOUS, ANXIOUS, OR ON EDGE: 2
4. TROUBLE RELAXING: 2
IF YOU CHECKED OFF ANY PROBLEMS ON THIS QUESTIONNAIRE, HOW DIFFICULT HAVE THESE PROBLEMS MADE IT FOR YOU TO DO YOUR WORK, TAKE CARE OF THINGS AT HOME, OR GET ALONG WITH OTHER PEOPLE: SOMEWHAT DIFFICULT
GAD7 TOTAL SCORE: 14
5. BEING SO RESTLESS THAT IT IS HARD TO SIT STILL: 2
6. BECOMING EASILY ANNOYED OR IRRITABLE: 2
7. FEELING AFRAID AS IF SOMETHING AWFUL MIGHT HAPPEN: 2
3. WORRYING TOO MUCH ABOUT DIFFERENT THINGS: 2

## 2023-06-20 ASSESSMENT — PATIENT HEALTH QUESTIONNAIRE - PHQ9
1. LITTLE INTEREST OR PLEASURE IN DOING THINGS: 1
SUM OF ALL RESPONSES TO PHQ QUESTIONS 1-9: 11
5. POOR APPETITE OR OVEREATING: 1
7. TROUBLE CONCENTRATING ON THINGS, SUCH AS READING THE NEWSPAPER OR WATCHING TELEVISION: 2
4. FEELING TIRED OR HAVING LITTLE ENERGY: 0
SUM OF ALL RESPONSES TO PHQ9 QUESTIONS 1 & 2: 3
6. FEELING BAD ABOUT YOURSELF - OR THAT YOU ARE A FAILURE OR HAVE LET YOURSELF OR YOUR FAMILY DOWN: 3
2. FEELING DOWN, DEPRESSED OR HOPELESS: 2
SUM OF ALL RESPONSES TO PHQ QUESTIONS 1-9: 11
10. IF YOU CHECKED OFF ANY PROBLEMS, HOW DIFFICULT HAVE THESE PROBLEMS MADE IT FOR YOU TO DO YOUR WORK, TAKE CARE OF THINGS AT HOME, OR GET ALONG WITH OTHER PEOPLE: 2
SUM OF ALL RESPONSES TO PHQ QUESTIONS 1-9: 11
9. THOUGHTS THAT YOU WOULD BE BETTER OFF DEAD, OR OF HURTING YOURSELF: 0
SUM OF ALL RESPONSES TO PHQ QUESTIONS 1-9: 11
8. MOVING OR SPEAKING SO SLOWLY THAT OTHER PEOPLE COULD HAVE NOTICED. OR THE OPPOSITE, BEING SO FIGETY OR RESTLESS THAT YOU HAVE BEEN MOVING AROUND A LOT MORE THAN USUAL: 0
3. TROUBLE FALLING OR STAYING ASLEEP: 2

## 2023-06-20 NOTE — PROGRESS NOTES
Behavioral Health Consultation  [facility], PsUbaldo  Psychologist  6/20/2023  1:38 PM EDT    Time spent with Patient: 18 minutes  This is patient's second [facility] appointment. Reason for Consult:    Chief Complaint   Patient presents with    Depression    Anxiety     Referring Provider: SIMRAN Marin        S:  Pt reports he is on medication again as of one week ago. Not back on trazadone though. Had insurance issues. Got fired from job- probably better given the tough environment. Cell phone also broke so hasn't been able to have own number for new job prospects to call. Has cleaned and re-arranged mood which has helped mood. Has been thinking about doing Legos again. Finds that to be very fun. Is able to do very complicated Lego sets and has since he was young.   Also has been listening to audiobooks for fun.        O:  MSE:    Attitude: cooperative and friendly  Consciousness: alert  Orientation: oriented to person, place, time, general circumstance  Memory: recent and remote memory intact  Attention/Concentration: intact during session  Speech: normal rate and volume, well-articulated  Mood: \"ok\"  Affect: euthymic and congruent  Perception: within normal limits  Thought Content: within normal limits  Thought Process: logical, coherent and goal-directed  Insight: good  Judgment: intact  Ability to understand instructions: Yes  Ability to respond meaningfully: Yes  Morbid Ideation: no   Suicide Assessment: no suicidal ideation, plan, or intent  Homicidal Ideation: no    History:    Medications:   Current Outpatient Medications   Medication Sig Dispense Refill    folic acid (FOLVITE) 1 MG tablet TAKE 1 TABLET BY MOUTH EVERY DAY 90 tablet 0    VITAMIN D-1000 MAX ST 25 MCG (1000 UT) TABS tablet TAKE 1 TABLET BY MOUTH EVERY DAY 90 tablet 1    buPROPion (WELLBUTRIN XL) 300 MG extended release tablet Take 1 tablet by mouth daily 90 tablet 1    ARIPiprazole (ABILIFY) 10 MG tablet Take 1 tablet by mouth

## 2023-07-06 DIAGNOSIS — F99 INSOMNIA DUE TO OTHER MENTAL DISORDER: ICD-10-CM

## 2023-07-06 DIAGNOSIS — F51.05 INSOMNIA DUE TO OTHER MENTAL DISORDER: ICD-10-CM

## 2023-07-06 RX ORDER — TRAZODONE HYDROCHLORIDE 50 MG/1
50 TABLET ORAL NIGHTLY PRN
Qty: 30 TABLET | Refills: 2 | Status: SHIPPED | OUTPATIENT
Start: 2023-07-06

## 2023-07-06 NOTE — TELEPHONE ENCOUNTER
Refill Request     CONFIRM preferred pharmacy with the patient. If Mail Order Rx - Pend for 90 day refill. Last Seen: Last Seen Department: 4/6/2023  Last Seen by PCP: 4/6/2023    Last Written: 4/6/2023 30 with 2     If no future appointment scheduled:  Review the last OV with PCP and review information for follow-up visit,  Route STAFF MESSAGE with patient name to the [facility] for scheduling with the following information:            -  Timing of next visit           -  Visit type ie Physical, OV, etc           -  Diagnoses/Reason ie. COPD, HTN - Do not use MEDICATION, Follow-up or CHECK UP - Give reason for visit      Next Appointment:   Future Appointments   Date Time Provider [address]   7/18/2023  1:30  [facility] Alert [facility]       Message sent to [address] [place] [address] to schedule appt with patient?   NO      Requested Prescriptions     Pending Prescriptions Disp Refills    traZODone (DESYREL) 50 MG tablet 30 tablet 2     Sig: Take 1 tablet by mouth nightly as needed for Sleep

## 2023-07-18 ENCOUNTER — OFFICE VISIT (OUTPATIENT)
Dept: PSYCHOLOGY | Age: 23
End: 2023-07-18
Payer: COMMERCIAL

## 2023-07-18 DIAGNOSIS — Z72.0 TOBACCO USE: ICD-10-CM

## 2023-07-18 DIAGNOSIS — F12.20 CANNABIS USE DISORDER, SEVERE, DEPENDENCE (HCC): ICD-10-CM

## 2023-07-18 DIAGNOSIS — F33.1 MODERATE EPISODE OF RECURRENT MAJOR DEPRESSIVE DISORDER (HCC): ICD-10-CM

## 2023-07-18 DIAGNOSIS — F41.1 GAD (GENERALIZED ANXIETY DISORDER): Primary | ICD-10-CM

## 2023-07-18 PROCEDURE — 1036F TOBACCO NON-USER: CPT | Performed by: PSYCHOLOGIST

## 2023-07-18 PROCEDURE — 90832 PSYTX W PT 30 MINUTES: CPT | Performed by: PSYCHOLOGIST

## 2023-07-18 ASSESSMENT — PATIENT HEALTH QUESTIONNAIRE - PHQ9
9. THOUGHTS THAT YOU WOULD BE BETTER OFF DEAD, OR OF HURTING YOURSELF: 0
SUM OF ALL RESPONSES TO PHQ9 QUESTIONS 1 & 2: 2
SUM OF ALL RESPONSES TO PHQ QUESTIONS 1-9: 13
6. FEELING BAD ABOUT YOURSELF - OR THAT YOU ARE A FAILURE OR HAVE LET YOURSELF OR YOUR FAMILY DOWN: 3
2. FEELING DOWN, DEPRESSED OR HOPELESS: 2
5. POOR APPETITE OR OVEREATING: 1
1. LITTLE INTEREST OR PLEASURE IN DOING THINGS: 0
8. MOVING OR SPEAKING SO SLOWLY THAT OTHER PEOPLE COULD HAVE NOTICED. OR THE OPPOSITE, BEING SO FIGETY OR RESTLESS THAT YOU HAVE BEEN MOVING AROUND A LOT MORE THAN USUAL: 0
SUM OF ALL RESPONSES TO PHQ QUESTIONS 1-9: 13
7. TROUBLE CONCENTRATING ON THINGS, SUCH AS READING THE NEWSPAPER OR WATCHING TELEVISION: 3
SUM OF ALL RESPONSES TO PHQ QUESTIONS 1-9: 13
3. TROUBLE FALLING OR STAYING ASLEEP: 3
4. FEELING TIRED OR HAVING LITTLE ENERGY: 1
SUM OF ALL RESPONSES TO PHQ QUESTIONS 1-9: 13

## 2023-07-18 NOTE — PROGRESS NOTES
Behavioral Health Consultation  [facility], [place]  Psychologist  7/18/2023  1:35 PM      Time spent with Patient: 30 minutes  This is patient's third [facility] appointment. Reason for Consult:    Chief Complaint   Patient presents with    Anxiety    Depression    Nicotine Dependence     Referring Provider: SIMRAN Angeles        S:  Pt set goals to 1) continue to engage in pleasurable activities for mood management 2) follow-up with referrals provided to specialty mental health     Pt reports he got a better job. Been there 6 days. 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 Enjoying it. Has also started trying to quit smoking and vaping. Trying to reduce alcohol as well. Has been using patch for 4 days. Hx of constipation and phobias. Now will eat, fall asleep, will vomit  Has diarrhea past year. No treatment yet. Hasn't talked to PCP yet but plans to. Was 300lbs at age 17. Lost weight in healthy way. Struggles with how body feels. Had endoscopy at age 14- had lesions. Had them removed. Lost referral info for therapy- needs them again. Struggling with sleep- hard time falling asleep- tosses and turns. Takes trazadone and helps fall asleep but doesn't keep him asleep. Gets 1-3 hours of sleep. Has always been a problem.          O:  MSE:    Attitude: cooperative and friendly  Consciousness: alert  Orientation: oriented to person, place, time, general circumstance  Memory: recent and remote memory intact  Attention/Concentration: intact during session  Speech: normal rate and volume, well-articulated  Mood: \"ok\"  Affect: euthymic and congruent  Perception: within normal limits  Thought Content: within normal limits  Thought Process: logical, coherent and goal-directed  Insight: good  Judgment: intact  Ability to understand instructions: Yes  Ability to respond meaningfully: Yes  Morbid Ideation: no   Suicide Assessment: no

## 2023-07-24 ENCOUNTER — OFFICE VISIT (OUTPATIENT)
Dept: FAMILY MEDICINE CLINIC | Age: 23
End: 2023-07-24
Payer: COMMERCIAL

## 2023-07-24 VITALS
SYSTOLIC BLOOD PRESSURE: 130 MMHG | BODY MASS INDEX: 26.14 KG/M2 | HEART RATE: 99 BPM | WEIGHT: 177 LBS | OXYGEN SATURATION: 98 % | DIASTOLIC BLOOD PRESSURE: 80 MMHG

## 2023-07-24 DIAGNOSIS — N52.9 ERECTILE DYSFUNCTION, UNSPECIFIED ERECTILE DYSFUNCTION TYPE: ICD-10-CM

## 2023-07-24 DIAGNOSIS — G89.29 CHRONIC BILATERAL LOWER ABDOMINAL PAIN: ICD-10-CM

## 2023-07-24 DIAGNOSIS — I73.00 RAYNAUD'S DISEASE WITHOUT GANGRENE: Primary | ICD-10-CM

## 2023-07-24 DIAGNOSIS — R10.32 CHRONIC BILATERAL LOWER ABDOMINAL PAIN: ICD-10-CM

## 2023-07-24 DIAGNOSIS — F10.20 ALCOHOL USE DISORDER, MODERATE, DEPENDENCE (HCC): ICD-10-CM

## 2023-07-24 DIAGNOSIS — R10.31 CHRONIC BILATERAL LOWER ABDOMINAL PAIN: ICD-10-CM

## 2023-07-24 DIAGNOSIS — F33.1 MODERATE EPISODE OF RECURRENT MAJOR DEPRESSIVE DISORDER (HCC): ICD-10-CM

## 2023-07-24 DIAGNOSIS — I10 ESSENTIAL HYPERTENSION: ICD-10-CM

## 2023-07-24 PROCEDURE — G8427 DOCREV CUR MEDS BY ELIG CLIN: HCPCS | Performed by: PHYSICIAN ASSISTANT

## 2023-07-24 PROCEDURE — 1036F TOBACCO NON-USER: CPT | Performed by: PHYSICIAN ASSISTANT

## 2023-07-24 PROCEDURE — G8419 CALC BMI OUT NRM PARAM NOF/U: HCPCS | Performed by: PHYSICIAN ASSISTANT

## 2023-07-24 PROCEDURE — 99214 OFFICE O/P EST MOD 30 MIN: CPT | Performed by: PHYSICIAN ASSISTANT

## 2023-07-24 PROCEDURE — 3078F DIAST BP <80 MM HG: CPT | Performed by: PHYSICIAN ASSISTANT

## 2023-07-24 PROCEDURE — 3074F SYST BP LT 130 MM HG: CPT | Performed by: PHYSICIAN ASSISTANT

## 2023-07-24 RX ORDER — AMLODIPINE BESYLATE 10 MG/1
10 TABLET ORAL DAILY
Qty: 90 TABLET | Refills: 1 | Status: SHIPPED | OUTPATIENT
Start: 2023-07-24

## 2023-07-24 NOTE — PROGRESS NOTES
concentration and dysphoric mood. Negative for agitation, behavioral problems, confusion, hallucinations, self-injury, sleep disturbance and suicidal ideas. The patient is not nervous/anxious and is not hyperactive. Objective   Physical Exam  Vitals reviewed. Constitutional:       Appearance: Normal appearance. Cardiovascular:      Rate and Rhythm: Normal rate and regular rhythm. Heart sounds: Normal heart sounds. Pulmonary:      Effort: Pulmonary effort is normal.      Breath sounds: Normal breath sounds. Abdominal:      General: Abdomen is flat. Bowel sounds are normal.      Palpations: Abdomen is soft. Tenderness: There is abdominal tenderness in the right lower quadrant, suprapubic area and left lower quadrant. There is no right CVA tenderness, left CVA tenderness, guarding or rebound. Musculoskeletal:      Right lower leg: No edema. Left lower leg: No edema. Skin:     Coloration: Skin is not pale. Neurological:      Mental Status: He is alert. Psychiatric:         Attention and Perception: Attention and perception normal.         Mood and Affect: Affect normal. Mood is depressed. Speech: Speech normal.         Behavior: Behavior normal. Behavior is cooperative. Thought Content: Thought content normal.         Cognition and Memory: Cognition and memory normal.         Judgment: Judgment normal.      Comments: Brighter affect today                An electronic signature was used to authenticate this note.     --SIMRAN Ramirez

## 2023-07-25 ASSESSMENT — ENCOUNTER SYMPTOMS
CONSTIPATION: 0
ANAL BLEEDING: 0
WHEEZING: 0
SHORTNESS OF BREATH: 0
VOMITING: 0
BLOOD IN STOOL: 0
COLOR CHANGE: 0
RECTAL PAIN: 0
NAUSEA: 1
DIARRHEA: 1
ABDOMINAL DISTENTION: 0
ABDOMINAL PAIN: 1

## 2023-08-07 RX ORDER — LINACLOTIDE 145 UG/1
145 CAPSULE, GELATIN COATED ORAL
Qty: 30 CAPSULE | Refills: 3 | OUTPATIENT
Start: 2023-08-07

## 2023-08-07 NOTE — TELEPHONE ENCOUNTER
Refill Request     CONFIRM preferred pharmacy with the patient. If Mail Order Rx - Pend for 90 day refill. Last Seen: Last Seen Department: 7/24/2023  Last Seen by PCP: 7/24/2023    Last Written: n/a    If no future appointment scheduled:  Review the last OV with PCP and review information for follow-up visit,  Route STAFF MESSAGE with patient name to the [facility] for scheduling with the following information:            -  Timing of next visit           -  Visit type ie Physical, OV, etc           -  Diagnoses/Reason ie. COPD, HTN - Do not use MEDICATION, Follow-up or CHECK UP - Give reason for visit      Next Appointment:   Future Appointments   Date Time Provider [address]   8/17/2023 11:00 AM SIMRAN Rodriguez - JAVI       Message sent to [address] [place] [address] to schedule appt with patient?   NO      Requested Prescriptions     Pending Prescriptions Disp Refills    LINZESS 145 MCG capsule 30 capsule 3     Sig: Take 1 capsule by mouth daily

## 2023-08-07 NOTE — TELEPHONE ENCOUNTER
Patient states he does see GI but he couldn't remember who it was, he said he will straighten it out and call them for the refill

## 2023-08-15 ENCOUNTER — HOSPITAL ENCOUNTER (OUTPATIENT)
Dept: CT IMAGING | Age: 23
Discharge: HOME | End: 2023-08-15
Payer: COMMERCIAL

## 2023-08-15 DIAGNOSIS — G89.29 CHRONIC BILATERAL LOWER ABDOMINAL PAIN: ICD-10-CM

## 2023-08-15 DIAGNOSIS — R10.31 CHRONIC BILATERAL LOWER ABDOMINAL PAIN: ICD-10-CM

## 2023-08-15 DIAGNOSIS — R10.32 CHRONIC BILATERAL LOWER ABDOMINAL PAIN: ICD-10-CM

## 2023-08-15 PROCEDURE — 74177 CT ABD & PELVIS W/CONTRAST: CPT

## 2023-08-15 PROCEDURE — 6360000004 HC RX CONTRAST MEDICATION: Performed by: PHYSICIAN ASSISTANT

## 2023-08-15 RX ADMIN — DIATRIZOATE MEGLUMINE AND DIATRIZOATE SODIUM 12 ML: 660; 100 LIQUID ORAL; RECTAL at 14:42

## 2023-08-15 RX ADMIN — IOPAMIDOL 75 ML: 755 INJECTION, SOLUTION INTRAVENOUS at 14:42

## 2023-09-20 DIAGNOSIS — F99 INSOMNIA DUE TO OTHER MENTAL DISORDER: ICD-10-CM

## 2023-09-20 DIAGNOSIS — F51.05 INSOMNIA DUE TO OTHER MENTAL DISORDER: ICD-10-CM

## 2023-09-20 RX ORDER — TRAZODONE HYDROCHLORIDE 50 MG/1
50 TABLET ORAL NIGHTLY PRN
Qty: 30 TABLET | Refills: 0 | Status: SHIPPED | OUTPATIENT
Start: 2023-09-20

## 2023-09-20 NOTE — TELEPHONE ENCOUNTER
Refill Request     CONFIRM preferred pharmacy with the patient. If Mail Order Rx - Pend for 90 day refill. Last Seen: Last Seen Department: 7/24/2023  Last Seen by PCP: 7/24/2023    Last Written: 07/06/2023 30 tab 2 refills     If no future appointment scheduled:  Review the last OV with PCP and review information for follow-up visit,  Route STAFF MESSAGE with patient name to the [facility] for scheduling with the following information:            -  Timing of next visit           -  Visit type ie Physical, OV, etc           -  Diagnoses/Reason ie. COPD, HTN - Do not use MEDICATION, Follow-up or CHECK UP - Give reason for visit      Next Appointment:   No future appointments. Message sent to [facility] [address] to schedule appt with patient? YES  Return in about 6 months (around 1/24/2024) for Mood.     Requested Prescriptions     Pending Prescriptions Disp Refills    traZODone (DESYREL) 50 MG tablet 30 tablet 2     Sig: Take 1 tablet by mouth nightly as needed for Sleep

## 2023-10-05 DIAGNOSIS — F33.1 MODERATE EPISODE OF RECURRENT MAJOR DEPRESSIVE DISORDER (HCC): ICD-10-CM

## 2023-10-06 RX ORDER — ARIPIPRAZOLE 10 MG/1
10 TABLET ORAL DAILY
Qty: 90 TABLET | Refills: 1 | OUTPATIENT
Start: 2023-10-06

## 2023-10-06 NOTE — TELEPHONE ENCOUNTER
Can we please call the pt and see if he has established with Dr. Carrie Liao?  We have a records release for her office

## 2023-10-06 NOTE — TELEPHONE ENCOUNTER
.Refill Request     CONFIRM preferred pharmacy with the patient. If Mail Order Rx - Pend for 90 day refill. Last Seen: Last Seen Department: 7/24/2023  Last Seen by PCP: 7/24/2023    Last Written: 4-6-23 90 with 1     If no future appointment scheduled:  Review the last OV with PCP and review information for follow-up visit,  Route STAFF MESSAGE with patient name to the [facility] for scheduling with the following information:            -  Timing of next visit           -  Visit type ie Physical, OV, etc           -  Diagnoses/Reason ie. COPD, HTN - Do not use MEDICATION, Follow-up or CHECK UP - Give reason for visit      Next Appointment:   No future appointments. Message sent to [address] [place] [address] to schedule appt with patient?   NO      Requested Prescriptions     Pending Prescriptions Disp Refills    ARIPiprazole (ABILIFY) 10 MG tablet [Pharmacy Med Name: ARIPIPRAZOLE 10 MG TABLET] 90 tablet 1     Sig: TAKE 1 TABLET BY MOUTH EVERY DAY

## 2023-11-08 DIAGNOSIS — F33.1 MODERATE EPISODE OF RECURRENT MAJOR DEPRESSIVE DISORDER (HCC): ICD-10-CM

## 2023-11-08 RX ORDER — BUPROPION HYDROCHLORIDE 300 MG/1
300 TABLET ORAL DAILY
Qty: 90 TABLET | Refills: 1 | OUTPATIENT
Start: 2023-11-08

## 2023-11-08 NOTE — TELEPHONE ENCOUNTER
Refill Request     CONFIRM preferred pharmacy with the patient.    If Mail Order Rx - Pend for 90 day refill.      Last Seen: Last Seen Department: 7/24/2023  Last Seen by PCP: 7/24/2023    Last Written: 4/06/2023, #90, 1 refills    If no future appointment scheduled:  Review the last OV with PCP and review information for follow-up visit,  Route STAFF MESSAGE with patient name to the  Pool for scheduling with the following information:            -  Timing of next visit           -  Visit type ie Physical, OV, etc           -  Diagnoses/Reason ie. COPD, HTN - Do not use MEDICATION, Follow-up or CHECK UP - Give reason for visit      Next Appointment:   No future appointments.    Message sent to  to schedule appt with patient?  N/A      Requested Prescriptions     Pending Prescriptions Disp Refills    buPROPion (WELLBUTRIN XL) 300 MG extended release tablet [Pharmacy Med Name: BUPROPION HCL  MG TABLET] 90 tablet 1     Sig: TAKE 1 TABLET BY MOUTH EVERY DAY

## 2023-11-30 DIAGNOSIS — F33.1 MODERATE EPISODE OF RECURRENT MAJOR DEPRESSIVE DISORDER (HCC): ICD-10-CM

## 2023-11-30 RX ORDER — BUPROPION HYDROCHLORIDE 300 MG/1
300 TABLET ORAL DAILY
Qty: 90 TABLET | Refills: 1 | OUTPATIENT
Start: 2023-11-30

## 2023-11-30 NOTE — TELEPHONE ENCOUNTER
Not as patient at our practice anymore. Seeing someone else at [facility] Dr Becky McGilligan

## 2023-12-09 NOTE — TELEPHONE ENCOUNTER
Not as patient at our practice anymore.  Seeing someone else at [facility] Dr Augustine Marin

## 2023-12-10 RX ORDER — PSYLLIUM HUSK 0.4 G
CAPSULE ORAL
Qty: 90 TABLET | Refills: 0 | Status: SHIPPED | OUTPATIENT
Start: 2023-12-10

## 2024-01-28 ENCOUNTER — OFFICE VISIT (OUTPATIENT)
Age: 24
End: 2024-01-28

## 2024-01-28 VITALS
WEIGHT: 177 LBS | SYSTOLIC BLOOD PRESSURE: 128 MMHG | DIASTOLIC BLOOD PRESSURE: 86 MMHG | HEART RATE: 115 BPM | OXYGEN SATURATION: 94 % | TEMPERATURE: 97.9 F | HEIGHT: 69 IN | RESPIRATION RATE: 16 BRPM | BODY MASS INDEX: 26.22 KG/M2

## 2024-01-28 DIAGNOSIS — M25.571 ACUTE RIGHT ANKLE PAIN: Primary | ICD-10-CM

## 2024-01-28 RX ORDER — DEXTROAMPHETAMINE SACCHARATE, AMPHETAMINE ASPARTATE, DEXTROAMPHETAMINE SULFATE AND AMPHETAMINE SULFATE 7.5; 7.5; 7.5; 7.5 MG/1; MG/1; MG/1; MG/1
30 TABLET ORAL EVERY MORNING
COMMUNITY

## 2024-01-28 RX ORDER — DEXTROAMPHETAMINE SACCHARATE, AMPHETAMINE ASPARTATE, DEXTROAMPHETAMINE SULFATE AND AMPHETAMINE SULFATE 2.5; 2.5; 2.5; 2.5 MG/1; MG/1; MG/1; MG/1
10 TABLET ORAL EVERY EVENING
COMMUNITY

## 2024-01-30 ENCOUNTER — OFFICE VISIT (OUTPATIENT)
Dept: ORTHOPEDIC SURGERY | Age: 24
End: 2024-01-30
Payer: COMMERCIAL

## 2024-01-30 VITALS — WEIGHT: 177 LBS | BODY MASS INDEX: 26.22 KG/M2 | HEIGHT: 69 IN

## 2024-01-30 DIAGNOSIS — M76.61 ACHILLES TENDINITIS OF RIGHT LOWER EXTREMITY: Primary | ICD-10-CM

## 2024-01-30 PROCEDURE — G8484 FLU IMMUNIZE NO ADMIN: HCPCS | Performed by: PHYSICIAN ASSISTANT

## 2024-01-30 PROCEDURE — L4361 PNEUMA/VAC WALK BOOT PRE OTS: HCPCS | Performed by: PHYSICIAN ASSISTANT

## 2024-01-30 PROCEDURE — G8427 DOCREV CUR MEDS BY ELIG CLIN: HCPCS | Performed by: PHYSICIAN ASSISTANT

## 2024-01-30 PROCEDURE — 1036F TOBACCO NON-USER: CPT | Performed by: PHYSICIAN ASSISTANT

## 2024-01-30 PROCEDURE — 99203 OFFICE O/P NEW LOW 30 MIN: CPT | Performed by: PHYSICIAN ASSISTANT

## 2024-01-30 PROCEDURE — G8419 CALC BMI OUT NRM PARAM NOF/U: HCPCS | Performed by: PHYSICIAN ASSISTANT

## 2024-01-30 RX ORDER — METHYLPREDNISOLONE 4 MG/1
TABLET ORAL
Qty: 1 KIT | Refills: 0 | Status: SHIPPED | OUTPATIENT
Start: 2024-01-30 | End: 2024-02-05

## 2024-01-30 NOTE — PROGRESS NOTES
Chief Complaint    Ankle Pain (right)      History of Present Illness:  Hammad Patel is a 23 y.o. male here for evaluation chief complaint of right posterior ankle and heel pain.  Patient states that 2 days ago he woke up with pain over the Achilles tendon into the retrocalcaneal area of the right foot and ankle.  Patient states that the pain was severe and was seen in an urgent care where x-rays were obtained but no acute fractures or dislocation was noted.  He was educated on ice elevation over-the-counter NSAID and was placed into a ankle support.  He states that since then he has had continued pain with weightbearing and is ambulating with a limp secondary to pain.  He denies any precipitating event or trauma.  He has had no previous injuries to the right foot or ankle..    Pain Assessment  Location of Pain: Ankle  Location Modifiers: Right  Severity of Pain: 10  Quality of Pain: Other (Comment)  Duration of Pain: Other (Comment)]    Medical History:  Patient's medications, allergies, past medical, surgical, social and family histories were reviewed and updated as appropriate.    Review of Systems:  Pertinent items are noted in HPI  Review of systems reviewed from Patient History Form dated on 1/30/2024 and available in the patient's chart under the Media tab.       Vital Signs:  Ht 1.753 m (5' 9.02\")   Wt 80.3 kg (177 lb)   BMI 26.13 kg/m²     General Exam:   Constitutional: Patient is adequately groomed with no evidence of malnutrition  DTRs: Deep tendon reflexes are intact  Mental Status: The patient is oriented to time, place and person.  The patient's mood and affect are appropriate.  Lymphatic: The lymphatic examination bilaterally reveals all areas to be without enlargement or induration.    Right ankle and heel examination:    Inspection: Today's inspection of the right foot, ankle, heel shows no erythema, ecchymosis, or swelling.  There is no penetrating or perforating wounds noted.  There is

## 2024-10-17 RX ORDER — PSYLLIUM HUSK 0.4 G
CAPSULE ORAL
Qty: 90 TABLET | Refills: 0 | OUTPATIENT
Start: 2024-10-17

## 2024-10-17 NOTE — TELEPHONE ENCOUNTER
Refill Request     CONFIRM preferred pharmacy with the patient.    If Mail Order Rx - Pend for 90 day refill.      Last Seen: Last Seen Department: 7/24/2023  Last Seen by PCP: Visit date not found    Last Written: 12/10/23 90 with 0 refills    If no future appointment scheduled:  Review the last OV with PCP and review information for follow-up visit,  Route STAFF MESSAGE with patient name to the  Pool for scheduling with the following information:            -  Timing of next visit           -  Visit type ie Physical, OV, etc           -  Diagnoses/Reason ie. COPD, HTN - Do not use MEDICATION, Follow-up or CHECK UP - Give reason for visit      Next Appointment:   No future appointments.    Message sent to  to schedule appt with patient?  NO      Requested Prescriptions     Pending Prescriptions Disp Refills    VITAMIN D-1000 MAX ST 25 MCG (1000 UT) TABS tablet [Pharmacy Med Name: VITAMIN D3 25 MCG TABLET] 90 tablet 0     Sig: TAKE 1 TABLET BY MOUTH EVERY DAY
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
## 2025-01-20 ENCOUNTER — HOSPITAL ENCOUNTER (OUTPATIENT)
Dept: NUCLEAR MEDICINE | Age: 25
Discharge: HOME | End: 2025-01-20
Attending: INTERNAL MEDICINE
Payer: COMMERCIAL

## 2025-01-20 DIAGNOSIS — K58.9 IRRITABLE BOWEL SYNDROME WITHOUT DIARRHEA: ICD-10-CM

## 2025-01-20 PROCEDURE — A9541 TC99M SULFUR COLLOID: HCPCS | Performed by: INTERNAL MEDICINE

## 2025-01-20 PROCEDURE — 3430000000 HC RX DIAGNOSTIC RADIOPHARMACEUTICAL: Performed by: INTERNAL MEDICINE

## 2025-01-20 PROCEDURE — 78264 GASTRIC EMPTYING IMG STUDY: CPT

## 2025-01-20 RX ADMIN — Medication 1.1 MILLICURIE: at 07:40
